# Patient Record
Sex: FEMALE | Race: WHITE | NOT HISPANIC OR LATINO | Employment: FULL TIME | URBAN - METROPOLITAN AREA
[De-identification: names, ages, dates, MRNs, and addresses within clinical notes are randomized per-mention and may not be internally consistent; named-entity substitution may affect disease eponyms.]

---

## 2021-07-21 ENCOUNTER — OFFICE VISIT (OUTPATIENT)
Dept: OBGYN CLINIC | Facility: CLINIC | Age: 46
End: 2021-07-21
Payer: COMMERCIAL

## 2021-07-21 VITALS
TEMPERATURE: 97.5 F | HEART RATE: 63 BPM | DIASTOLIC BLOOD PRESSURE: 60 MMHG | WEIGHT: 124.8 LBS | SYSTOLIC BLOOD PRESSURE: 98 MMHG

## 2021-07-21 DIAGNOSIS — Z12.4 CERVICAL CANCER SCREENING: ICD-10-CM

## 2021-07-21 DIAGNOSIS — R10.2 PELVIC PAIN: ICD-10-CM

## 2021-07-21 DIAGNOSIS — D25.1 INTRAMURAL LEIOMYOMA OF UTERUS: Primary | ICD-10-CM

## 2021-07-21 PROCEDURE — G0476 HPV COMBO ASSAY CA SCREEN: HCPCS | Performed by: OBSTETRICS & GYNECOLOGY

## 2021-07-21 PROCEDURE — 99203 OFFICE O/P NEW LOW 30 MIN: CPT | Performed by: OBSTETRICS & GYNECOLOGY

## 2021-07-21 PROCEDURE — G0145 SCR C/V CYTO,THINLAYER,RESCR: HCPCS | Performed by: OBSTETRICS & GYNECOLOGY

## 2021-07-21 RX ORDER — IBUPROFEN 600 MG/1
600 TABLET ORAL EVERY 6 HOURS PRN
Qty: 30 TABLET | Refills: 0 | Status: SHIPPED | OUTPATIENT
Start: 2021-07-21 | End: 2021-09-08 | Stop reason: HOSPADM

## 2021-07-21 RX ORDER — NORETHINDRONE ACETATE AND ETHINYL ESTRADIOL 1MG-20(21)
1 KIT ORAL DAILY
COMMUNITY
End: 2021-11-18 | Stop reason: HOSPADM

## 2021-07-21 RX ORDER — MISOPROSTOL 200 UG/1
200 TABLET ORAL DAILY
Qty: 2 TABLET | Refills: 0 | Status: SHIPPED | OUTPATIENT
Start: 2021-07-21 | End: 2021-11-17

## 2021-07-21 NOTE — PATIENT INSTRUCTIONS
Endometrial Ablation   WHAT YOU SHOULD KNOW:   Endometrial ablation (EA) is a procedure to destroy the endometrium (lining of your uterus)  You may need EA if you have heavy or abnormal vaginal bleeding  CARE AGREEMENT:   You have the right to help plan your care  Learn about your health condition and how it may be treated  Discuss treatment options with your caregivers to decide what care you want to receive  You always have the right to refuse treatment  RISKS:   · You may have nausea, vomiting, or abdominal cramps  You may have vaginal discharge and bleeding after the procedure  Your cervix, uterus, or nearby organs may be burned or damaged  A blockage may form that causes blood to pool inside your uterus  · You may develop an infection in your vagina, urinary tract, or uterus  The infection may spread to other parts of your body  You may get a blood clot in your leg or arm  Your body may absorb too much fluid that was used to widen your uterus  This may cause brain swelling and damage  These may become life-threatening  GETTING READY:   The week before your procedure:   · Write down the correct date, time, and location of your procedure  · Arrange a ride home  Ask a family member or friend to drive you home after your surgery or procedure  Do not drive yourself home  · Ask your caregiver if you need to stop using aspirin or any other prescribed or over-the-counter medicine before your procedure or surgery  · Bring your medicine bottles or a list of your medicines when you see your caregiver  Tell your caregiver if you are allergic to any medicine  Tell your caregiver if you use any herbs, food supplements, or over-the-counter medicine  · You may need to have a transvaginal ultrasound to check the thickness of your uterine lining  You may need a hysteroscopy or other imaging tests to check the size and shape of your uterus and cervix   Talk to your healthcare provider about these or other tests you may need  Write down the date, time, and location for each test   The night before your procedure:  Ask caregivers about directions for eating and drinking  The day of your procedure:   · Ask your caregiver before taking any medicine on the day of your procedure  These medicines include insulin, diabetic pills, high blood pressure pills, or heart pills  Bring a list of all the medicines you take, or your pill bottles, with you to the hospital     · You or a close family member will be asked to sign a legal document called a consent form  It gives caregivers permission to do the procedure or surgery  It also explains the problems that may happen, and your choices  Make sure all your questions are answered before you sign this form  · Caregivers may insert an intravenous tube (IV) into your vein  A vein in the arm is usually chosen  Through the IV tube, you may be given liquids and medicine  · An anesthesiologist will talk to you before your surgery  You may need medicine to keep you asleep or numb an area of your body during surgery  Tell caregivers if you or anyone in your family has had a problem with anesthesia in the past   TREATMENT:   What will happen: Your surgeon will widen the opening of your cervix with medicine or medical tools  He will use one of the following to destroy the lining of your uterus:  · Extremely hot fluid  flushed into your uterus or delivered through a balloon at the end of a catheter    · Ice  on the end of a probe to freeze the lining of your uterus    · Electric, microwave, or radiofrequency energy  given off through a heated roller ball, wire loop, probe, or mesh device  After your procedure: You will be taken to a room to rest until you are fully awake  You will be monitored closely for any problems  Do not get out of bed until your healthcare provider says it is okay  You will then be able to go home or be taken to your hospital room     CONTACT A CAREGIVER IF: · You cannot make it to your procedure  · You have a fever  · You think you might be pregnant  · You get a cold or the flu  · You have questions or concerns about your procedure  SEEK CARE IMMEDIATELY IF:   · You have increased blood loss during your monthly period  · You have new weakness or dizziness  © 2014 [address] [employer] is for End User's use only and may not be sold, redistributed or otherwise used for commercial purposes  All illustrations and images included in CareNotes® are the copyrighted property of [employer]  or Mario Ramirez  The above information is an  only  It is not intended as medical advice for individual conditions or treatments  Talk to your doctor, nurse or pharmacist before following any medical regimen to see if it is safe and effective for you

## 2021-07-21 NOTE — PROGRESS NOTES
Assessment/Plan:     Diagnoses and all orders for this visit:    Intramural leiomyoma of uterus  -     misoprostol (CYTOTEC) 200 mcg tablet; Take 1 tablet (200 mcg total) by mouth daily for 2 days Prior to procedure  -     Ambulatory referral to Interventional Radiology  -     ibuprofen (MOTRIN) 600 mg tablet; Take 1 tablet (600 mg total) by mouth every 6 (six) hours as needed for moderate pain    Pelvic pain  -     ibuprofen (MOTRIN) 600 mg tablet; Take 1 tablet (600 mg total) by mouth every 6 (six) hours as needed for moderate pain    Cervical cancer screening  -     Liquid-based pap, screening    Other orders  -     norethindrone-ethinyl estradiol (JUNEL FE 1/20) 1-20 MG-MCG per tablet; Take 1 tablet by mouth daily            Discussed with patient that uterine fibroids are common benign tumor  The could either grow or regress in size  Discussed with patient that asymptomatic uterine fibroids can usually be followed without intervention       Discussed with patient that prophylactic therapy to avoid future complications from fibroids is not recommended  The goal for treatment of fibroids is to relieve symptoms including abnormal uterine bleeding, pain or pressure  Plans for treatment should be individualized based on type and severity of symptoms, size of fibroids, location of fibroids as well as patient age and plans for future fertility  For expectant management, we can follow annual pelvic exams or follow-up imaging if uterine size is increasing or if symptoms are worsening  With regards to medical therapy, a trial of medical therapy in premenopausal women with mild symptoms or mildly enlarged fibroid can be useful for helping treat symptoms of heavy menstrual bleeding  Options to treat heavy bleeding associated with fibroids may include combined oral contraceptive pills versus progestin therapy versus levonorgestrel releasing Intrauterine device versus progestin injection    She can also use tranexamic acid PRN heavy menses  Another option for medical therapy is GnRH agonist such as depo Lupron  This have been known to decrease the size of uterine fibroids within 3 months of initiating therapy  There is rapid resumption of menses after discontinuing of this medication  There is up articular and goal in mind which should be reduction of uterine size prior to surgery and or resolution of anemia or both  There are side effects associated with GnRH agonist therapy including bone loss  She declines medical treatment but wishes to continue oral contraceptive pills    Surgery is the mainstay of therapy for fibroids  Hysterectomy is recommended for women who have  completed their childbearing that have abnormal uterine bleeding or bulk related symptoms who have not responded to medical therapy, and women who desired definitive treatment  Myomectomy is an option for women who have not completed their child bearing  And women will have completed childbearing, endometrial ablation may be an option to treat bleeding abnormalities  However these may only be used for women with a normal size cavity  Patient is not interested in hysterectomy  She is potentially exploring uterine artery embolization  Discussed with patient risks associated with uterine embolization including increased pain after the procedure  Discussed focused ultrasound surgery and referral to a physician that performs these procedures  Advised endometrial biopsy for now to rule out endometrial cancer  Referral to Interventional Radiology was placed for possible uterine artery embolization  Subjective   Patient ID: Karley Jara is a 48 y o  female  Patient is here for a problem visit  Chief Complaint   Patient presents with   [facility] Patient Visit     Patient presents to office to disucss fibroids and cysts  Patient states she has had pain for the past 3 months and feels bloated constantly  Patient is here due to pain related to fibroids  She has been seen by PCP at [facility] and has had a pelvic ultrasound as well as an MRI  H/o fibroids and cysts  On Junel 1/20 for OCPs    Moved from [facility] 1 year ago     x 2   1 SAB  Sexually active      She is having pain x 3 months  She states pain is ranges from 3-4/10 to 10/10, constant pain  She has no pain with intercourse  She has bloating  She has had no changes in bowel habits  No pain/trouble with urination  Pelvic US      She has not seen a GI  Menstrual History:  OB History        3    Para   2    Term   2       0    AB   1    Living   2       SAB   0    TAB   1    Ectopic   0    Multiple   0    Live Births   2           Obstetric Comments   Menarche     x 2  ON Junel 1/20              Menarche age: 13  No LMP recorded (lmp unknown)  She has very light periods on OCPs  Periods last 2-3 days  She has no pain with periods  History reviewed  No pertinent past medical history  Past Surgical History:   Procedure Laterality Date    TONSILLECTOMY      WISDOM TOOTH EXTRACTION         Social History     Tobacco Use    Smoking status: Former Smoker     Types: Cigarettes    Smokeless tobacco: Never Used    Tobacco comment: Smoked from ages [age]   Vaping Use    Vaping Use: Never used   Substance Use Topics    Alcohol use: Yes     Comment: Wine    Drug use: Never        No Known Allergies      Current Outpatient Medications:     norethindrone-ethinyl estradiol (JUNEL FE 1/20) 1-20 MG-MCG per tablet, Take 1 tablet by mouth daily, Disp: , Rfl:     ibuprofen (MOTRIN) 600 mg tablet, Take 1 tablet (600 mg total) by mouth every 6 (six) hours as needed for moderate pain, Disp: 30 tablet, Rfl: 0    misoprostol (CYTOTEC) 200 mcg tablet, Take 1 tablet (200 mcg total) by mouth daily for 2 days Prior to procedure, Disp: 2 tablet, Rfl: 0      Review of Systems   Constitutional: Negative  HENT: Negative      Eyes: Negative  Respiratory: Negative  Cardiovascular: Negative  Gastrointestinal: Negative  Endocrine: Negative  Genitourinary:        As noted in HPI   Musculoskeletal: Negative  Skin: Negative  Allergic/Immunologic: Negative  Neurological: Negative  Hematological: Negative  Psychiatric/Behavioral: Negative  BP 98/60 (BP Location: Right arm, Patient Position: Sitting, Cuff Size: Adult)   Pulse 63   Temp 97 5 °F (36 4 °C) (Temporal)   Wt 56 6 kg (124 lb 12 8 oz)   LMP  (LMP Unknown)       Physical Exam  Constitutional:       General: She is not in acute distress  Appearance: She is well-developed  Genitourinary:      Pelvic exam was performed with patient in the lithotomy position  Inguinal canal, urethra, bladder, cervix, right adnexa and left adnexa normal       No vulval lesion, tenderness, ulcerations, Bartholin's cyst or rash noted  No signs of labial injury  No posterior fourchette tenderness, injury, rash or lesion present  No signs of injury or lesions in the vagina  No vaginal discharge, erythema, tenderness, bleeding, atrophy or prolapse  No cervical polyp  Uterus is enlarged and tender  No uterine mass detected  Uterus is regular  No right or left adnexal mass present  Right adnexa not tender or full  Left adnexa not tender or full  Genitourinary Comments: Uterus is tender and large mainly on patient's right   Abdominal:      General: There is no distension  Palpations: Abdomen is soft  Tenderness: There is no abdominal tenderness  Neurological:      Mental Status: She is alert and oriented to person, place, and time  Skin:     General: Skin is warm and dry  Psychiatric:         Behavior: Behavior normal          CBC AND DIFFERENTIAL  Order: [id number]  (suggestion)  Result Information displayed in this report will not trend and may not trigger automated decision support      Ref Range & Units 5/28/21  7:46 AM   Hemoglobin 11 5 - 14 5 g/dL 14 0        Hematocrit 35 0 - 43 0 % 41 3        WBC 4 0 - 10 0 thou/cmm 6 1        RBC 3 70 - 4 70 mill/cmm 4 54        Platelet Count 046 - 350 thou/cmm 214        MPV 7 5 - 11 3 fL 8 3        MCV 80 - 100 fL 91        MCH 26 0 - 34 0 pg 30 8        MCHC 32 0 - 37 0 g/dL 33 9        RDW 12 0 - 16 0 % 13 0        Differential Type   AUTO        Absolute Neutrophils 1 8 - 7 8 thou/cmm 4 0        Absolute Lymphocytes 1 0 - 3 0 thou/cmm 1 4        Absolute Monocytes 0 3 - 1 0 thou/cmm 0 4        Absolute Eosinophils 0 0 - 0 5 thou/cmm 0 2        Absolute Basophils 0 0 - 0 1 thou/cmm 0 0        Neutrophils  % 64        Lymphocytes  % 24        Monocytes  % 7        Eosinophils  % 4        Basophils  % 1        Specimen Collected: 05/28/21  7:46 AM Last Resulted: 05/28/21  1:34 PM   Received From: [facility]  Result Received: 07/20/21  9:50 PM             US TRANSVAGINAL    Impression    IMPRESSION:   1  A large mass at the right, posterior uterine body measures up to 5 1 cm and   contains a large central cystic component measuring up to 3 6 cm  This mass is   of uncertain etiology, though could represent a uterine fibroid with central   cystic necrosis  A neoplastic etiology cannot be excluded  A normal right ovary   is not visualized, though a right ovarian mass would be less likely, as there is   myometrial tissue splayed around the anterior and medial aspects of the mass  Contrast-enhanced pelvic MRI is recommended for more detailed evaluation of the   mass and to evaluate for the presence of a normal right ovary  2  A posterior uterine fundal fibroid measures up to 2 7 cm    3  Normal endometrium measuring up to 3 6 mm    4  Normal left ovary containing a dominant follicle measuring up to 1 9 cm  Workstation:[id number]  Narrative    History: Right lower quadrant pain  Transvaginal ultrasound of the pelvis was performed  No transabdominal   examination was performed, as specifically requested by the referring clinician  There are no prior studies at this institution for comparison  The uterus measures 9 1 cm long, 5 3 cm thick and 5 6 cm wide  There is   heterogeneous myometrial echotexture  At the posterior uterine fundus, there is   a subserosal fibroid measuring 2 4 x 2 7 x 2 5 cm  The endometrium measures up   to 3 6 mm thick, within normal limits  There is a large mass at the right posterior uterine body, with myometrial   tissue splayed around the anterior and medial aspects of the mass  The mass   measures approximately 5 1 x 4 6 x 5 1 cm and demonstrates a peripheral,   heterogeneous soft tissue component and a large central cystic component  The   central cystic component measures 3 5 x 3 6 x 3 3 cm  There is bloodflow within   the peripheral soft tissue component  This mass exerts some mass effect on the   right aspect of the endometrium, mildly displacing the right endometrium   anteriorly  No normal ovary is identified  The left ovary measures 2 7 x 2 3 x 2 5 cm and   contains a dominant follicle measuring 1 9 x 1 8 x 1 5 cm  There is arterial and   venous blood flow within the left ovary on Doppler examination  There is no   extraovarian left adnexal mass or fluid collection  There is no free pelvic   fluid  Other Result Text    Nuvia Ramires MD - 06/25/2021   Formatting of this note might be different from the original    History: Right lower quadrant pain  Transvaginal ultrasound of the pelvis was performed  No transabdominal   examination was performed, as specifically requested by the referring clinician  There are no prior studies at this institution for comparison  The uterus measures 9 1 cm long, 5 3 cm thick and 5 6 cm wide  There is   heterogeneous myometrial echotexture  At the posterior uterine fundus, there is   a subserosal fibroid measuring 2 4 x 2 7 x 2 5 cm   The endometrium measures up   to 3 6 mm thick, within normal limits  There is a large mass at the right posterior uterine body, with myometrial   tissue splayed around the anterior and medial aspects of the mass  The mass   measures approximately 5 1 x 4 6 x 5 1 cm and demonstrates a peripheral,   heterogeneous soft tissue component and a large central cystic component  The   central cystic component measures 3 5 x 3 6 x 3 3 cm  There is bloodflow within   the peripheral soft tissue component  This mass exerts some mass effect on the   right aspect of the endometrium, mildly displacing the right endometrium   anteriorly  No normal ovary is identified  The left ovary measures 2 7 x 2 3 x 2 5 cm and   contains a dominant follicle measuring 1 9 x 1 8 x 1 5 cm  There is arterial and   venous blood flow within the left ovary on Doppler examination  There is no   extraovarian left adnexal mass or fluid collection  There is no free pelvic   fluid  IMPRESSION:   IMPRESSION:   1  A large mass at the right, posterior uterine body measures up to 5 1 cm and   contains a large central cystic component measuring up to 3 6 cm  This mass is   of uncertain etiology, though could represent a uterine fibroid with central   cystic necrosis  A neoplastic etiology cannot be excluded  A normal right ovary   is not visualized, though a right ovarian mass would be less likely, as there is   myometrial tissue splayed around the anterior and medial aspects of the mass  Contrast-enhanced pelvic MRI is recommended for more detailed evaluation of the   mass and to evaluate for the presence of a normal right ovary  2  A posterior uterine fundal fibroid measures up to 2 7 cm    3  Normal endometrium measuring up to 3 6 mm    4  Normal left ovary containing a dominant follicle measuring up to 1 9 cm                     Workstation:[id number]     Date: 06/25/21   Received From: [facility]         MRI [facility] COGNITIVE DISORDERS CONTRAST    Impression    IMPRESSION:   1  Uterine leiomyomas, one of which in the right uterine body appears to show   cystic degeneration   2  Normal ovaries           Workstation:[id number]  Narrative    History: Abnormal ultrasound demonstrating uterine mass     MRI of the pelvis was obtained on a 1 5 [facility] MRI unit with and without   intravenous contrast  Postcontrast sequences include axial, coronal and sagittal   T1-weighted fat saturated sequences obtained after administration of 5 5 cc of   Gadavist intravenously  Comparison made to ultrasound 6/25/2021  FINDINGS: Uterus is slightly bulky and lobulated, secondary to a T2 hypointense   intramural leiomyoma located at the right uterine fundus measuring about 2 5 x   2 2 x 1 6 cm  Just posterior to this region in the right uterine body, there is in intramural   cystic lesion with fluid fluid level measuring about 4 7 x 3 8 x 3 9 cm  There   is a T2 hypointense rim around this mass suggesting that this represents a   leiomyoma with cystic degeneration  This distorts and displacing the endometrium   to the left  Endometrium itself is unremarkable  Multiple small nabothian cysts are present   in the cervix  Bilateral ovaries are normal in appearance with small,   physiologic follicles within  There is no free fluid in the pelvis  There is no adenopathy in the pelvis  Visualized bones are unremarkable  Other Result Text    Zachary Alva MD - 07/12/2021   Formatting of this note might be different from the original    History: Abnormal ultrasound demonstrating uterine mass     MRI of the pelvis was obtained on a 1 5 [facility] MRI unit with and without   intravenous contrast  Postcontrast sequences include axial, coronal and sagittal   T1-weighted fat saturated sequences obtained after administration of 5 5 cc of   Gadavist intravenously  Comparison made to ultrasound 6/25/2021       FINDINGS: Uterus is slightly bulky and lobulated, secondary to a T2 hypointense   intramural leiomyoma located at the right uterine fundus measuring about 2 5 x   2 2 x 1 6 cm  Just posterior to this region in the right uterine body, there is in intramural   cystic lesion with fluid fluid level measuring about 4 7 x 3 8 x 3 9 cm  There   is a T2 hypointense rim around this mass suggesting that this represents a   leiomyoma with cystic degeneration  This distorts and displacing the endometrium   to the left  Endometrium itself is unremarkable  Multiple small nabothian cysts are present   in the cervix  Bilateral ovaries are normal in appearance with small,   physiologic follicles within  There is no free fluid in the pelvis  There is no adenopathy in the pelvis  Visualized bones are unremarkable  IMPRESSION:   IMPRESSION:   1  Uterine leiomyomas, one of which in the right uterine body appears to show   cystic degeneration   2   Normal ovaries           Workstation:[id number]     Date: 07/12/21   Received From: [facility]

## 2021-07-23 LAB
HPV HR 12 DNA CVX QL NAA+PROBE: NEGATIVE
HPV16 DNA CVX QL NAA+PROBE: NEGATIVE
HPV18 DNA CVX QL NAA+PROBE: NEGATIVE

## 2021-07-27 ENCOUNTER — HOSPITAL ENCOUNTER (OUTPATIENT)
Dept: RADIOLOGY | Facility: HOSPITAL | Age: 46
Discharge: HOME | End: 2021-07-27
Attending: RADIOLOGY

## 2021-07-27 DIAGNOSIS — Z76.89 REFERRAL OF PATIENT WITHOUT EXAMINATION OR TREATMENT: ICD-10-CM

## 2021-07-28 LAB
LAB AP GYN PRIMARY INTERPRETATION: NORMAL
Lab: NORMAL

## 2021-07-30 ENCOUNTER — TELEPHONE (OUTPATIENT)
Dept: OTHER | Facility: OTHER | Age: 46
End: 2021-07-30

## 2021-07-30 ENCOUNTER — TELEMEDICINE (OUTPATIENT)
Dept: INTERVENTIONAL RADIOLOGY/VASCULAR | Facility: CLINIC | Age: 46
End: 2021-07-30
Payer: COMMERCIAL

## 2021-07-30 DIAGNOSIS — D25.9 UTERINE LEIOMYOMA, UNSPECIFIED LOCATION: Primary | ICD-10-CM

## 2021-07-30 PROCEDURE — 99213 OFFICE O/P EST LOW 20 MIN: CPT | Performed by: RADIOLOGY

## 2021-07-30 NOTE — PROGRESS NOTES
Virtual Brief Visit    Verification of patient location:    Patient is located in the following state in which I hold an active license [place]      Assessment/Plan:    Problem List Items Addressed This Visit        Genitourinary    Uterine leiomyoma - Primary     48 y F with medical history of pelvic pain and fibroid uterus  Patient is seen in IR clinic for consideration of uterine artery embolization  I discussed the different options to treat symptomatic uterine fibroids  including watchful waiting, medical therapy, surgery, and uterine artery embolization  I discussed with the patient that hysterectomy is a curative therapy because the uterus is completely removed  I discussed the benefits, risks and procedure details of uterine artery embolization  Specifically, I discussed that the procedure is often done using moderate sedation and that favorable outcomes are generally seen in roughly 90% of women with an approximate 10% reintervention rate either with repeat uterine artery embolization or hysterectomy  However, I discussed that symptomatic improvement is often not immediate and more commonly seen in patients with heavy bleeding and bulk related pain  I discussed with the patient that a significant amount of her pain could be related to a large fibroid that is infarcting due to the observation of cystic degeneration  I explained that there is still roughly 10 to 20% peripheral enhancing component and that uterine artery embolization would potentially infarct the remaining amount  This may or may not eliminate the pain she has over time  I also stated that it is unclear whether or not she is having bulk related pain or if the pain is just related to the degenerating fibroid  I stated that there be no guarantee that she would have resolution of her pain following uterine artery embolization    Given her reported history of urinary frequency and intermittent feelings of incomplete voiding, there could be some reduction in her uterine volume which could potentially produce mass effect upon the bladder if indeed this is the culprit  I discussed that we often admit overnight for observation and management of pain  I discussed risks such as bleeding, infection, nontarget embolization, myometrial injury, and transient or permanent amenorrhea (1 to 5%)  Rarely, pulmonary embolism  Additionally, I discussed that patients need to be willing to undergo hysterectomy in the event of [place] complication such a possible sloughed fibroid with non passage resulting in infection  Following our discussion, the patient will think about  I will follow-up with her on Monday 8/9/2021  Reason for visit is   Chief Complaint   Patient presents with    Virtual Brief Visit        Encounter provider Irving Marroquin DO    Provider located at [address] [place] [address],Third Floor  [facility] [address]  [phone]    Recent Visits  Date Type Provider Dept   07/30/21 [address], DO Pg Ir Galina Harman recent visits within past 7 days and meeting all other requirements  Future Appointments  No visits were found meeting these conditions  Showing future appointments within next 150 days and meeting all other requirements       After connecting through telephone and patient was informed that this is not a secure, HIPAA-compliant platform  She agrees to proceed  , the patient was identified by name and date of birth  My office door was closed  No one else was in the room  She acknowledged consent and understanding of privacy and security of the platform  The patient has agreed to participate and understands she can discontinue the visit at any time  Patient is aware this is a billable service  CC: Pelvic pain      HPI:  42-year-old female with history of uterine fibroids with pelvic pain referred to interventional radiology to discuss possible uterine artery embolization  The visit was conducted by telephone  The patient reports history of pain that started approximately 1 year ago and then went away  She is originally from [place]  Three months ago, the pain started again and has been fairly constant with intermittent degrees of severity  She describes cramping and some times stabbing pelvic pain  She is currently on misoprostol and ibuprofen  She denies any issues with heavy bleeding  She complains of intermittent urinary frequency and occasional incomplete voiding  She has no pain with intercourse  She endorses bloating and abdominal distension  She is currently on oral birth control pills  She denies history of genitourinary infection or malignancy   x 2, SAB x 1  PAP smear negative (2021)  She had US (21) and MRI (2021) at [facility] showing fibroids  Intramural right body 4 5 cm fibroid with cystic degeneration  Right intramural 2 5 cm fundal fibroid  No past medical history on file  Past Surgical History:   Procedure Laterality Date    TONSILLECTOMY      WISDOM TOOTH EXTRACTION         Current Outpatient Medications   Medication Sig Dispense Refill    ibuprofen (MOTRIN) 600 mg tablet Take 1 tablet (600 mg total) by mouth every 6 (six) hours as needed for moderate pain 30 tablet 0    misoprostol (CYTOTEC) 200 mcg tablet Take 1 tablet (200 mcg total) by mouth daily for 2 days Prior to procedure 2 tablet 0    norethindrone-ethinyl estradiol (JUNEL FE 1/20) 1-20 MG-MCG per tablet Take 1 tablet by mouth daily       No current facility-administered medications for this visit  No Known Allergies    Review of Systems   Constitutional: Negative  HENT: Negative  Eyes: Negative  Respiratory: Negative  Cardiovascular: Negative  Gastrointestinal: Positive for abdominal distention  Endocrine: Negative  Genitourinary: Positive for frequency  Musculoskeletal: Negative  Allergic/Immunologic: Negative  Neurological: Negative  Hematological: Negative  Psychiatric/Behavioral: Negative  There were no vitals filed for this visit  Imaging: I personally reviewed her imaging  MRI 7/12/2021:   FINDINGS: Uterus is slightly bulky and lobulated, secondary to a T2 hypointense   intramural leiomyoma located at the right uterine fundus measuring about 2 5 x   2 2 x 1 6 cm  Just posterior to this region in the right uterine body, there is in intramural   cystic lesion with fluid fluid level measuring about 4 7 x 3 8 x 3 9 cm  There   is a T2 hypointense rim around this mass suggesting that this represents a   leiomyoma with cystic degeneration  This distorts and displacing the endometrium   to the left  Endometrium itself is unremarkable  Multiple small nabothian cysts are present   in the cervix  Bilateral ovaries are normal in appearance with small,   physiologic follicles within  There is no free fluid in the pelvis  There is no adenopathy in the pelvis  Visualized bones are unremarkable  I spent 30 minutes minutes with patient today in which greater than 50% of the time was spent in counseling/coordination of care regarding management of her fibroids  VIRTUAL VISIT DISCLAIMER      Muriel Narayan verbally agrees to participate in [employer]  Pt is aware that Virtual Care Services could be limited without vital signs or the ability to perform a full hands-on physical Marcell Matson understands she or the provider may request at any time to terminate the video visit and request the patient to seek care or treatment in person

## 2021-08-04 ENCOUNTER — TELEPHONE (OUTPATIENT)
Dept: INTERVENTIONAL RADIOLOGY/VASCULAR | Facility: CLINIC | Age: 46
End: 2021-08-04

## 2021-08-04 ENCOUNTER — PATIENT MESSAGE (OUTPATIENT)
Dept: INTERVENTIONAL RADIOLOGY/VASCULAR | Facility: CLINIC | Age: 46
End: 2021-08-04

## 2021-08-04 NOTE — TELEPHONE ENCOUNTER
I called pt to let her know that Dr Jazzy Ludwig had reviewed her images Monday afternoon  Once he places the order, that someone from IR Scheduling will call her to schedule the procedure  Pt was fine with this

## 2021-08-04 NOTE — TELEPHONE ENCOUNTER
I called the pt to let her know that Dr Denisa Roberts had viewed her images Monday afternoon  Once he places the order that someone form IR Central Scheduling will call to schedule procedure

## 2021-08-04 NOTE — TELEPHONE ENCOUNTER
----- Message from Meagan Samaniego RN sent at 8/4/2021  8:17 AM EDT -----  Regarding: FW: Visit Follow-Up Question  Contact: [phone]    ----- Message -----  From: Belinda Vasquez  Sent: 8/4/2021   8:09 AM EDT  To: [facility]  Subject: FW: Visit Follow-Up Question                       ----- Message -----  From: Jael Tavares  Sent: 8/4/2021   8:03 AM EDT  To: [facility]  Subject: Visit Follow-Up Question                         Raffaele Postal all is well with you  Nobody has yet called me to schedule the procedure  I was wondering if there was a problem with retrieving the MRI's images?     Thank you  Lawanda Ugarte

## 2021-08-06 PROBLEM — D25.9 UTERINE LEIOMYOMA: Status: ACTIVE | Noted: 2021-08-06

## 2021-08-06 NOTE — ASSESSMENT & PLAN NOTE
48 y F with medical history of pelvic pain and fibroid uterus  Patient is seen in IR clinic for consideration of uterine artery embolization  I discussed the different options to treat symptomatic uterine fibroids  including watchful waiting, medical therapy, surgery, and uterine artery embolization  I discussed with the patient that hysterectomy is a curative therapy because the uterus is completely removed  I discussed the benefits, risks and procedure details of uterine artery embolization  Specifically, I discussed that the procedure is often done using moderate sedation and that favorable outcomes are generally seen in roughly 90% of women with an approximate 10% reintervention rate either with repeat uterine artery embolization or hysterectomy  However, I discussed that symptomatic improvement is often not immediate and more commonly seen in patients with heavy bleeding and bulk related pain  I discussed with the patient that a significant amount of her pain could be related to a large fibroid that is infarcting due to the observation of cystic degeneration  I explained that there is still roughly 10 to 20% peripheral enhancing component and that uterine artery embolization would potentially infarct the remaining amount  This may or may not eliminate the pain she has over time  I also stated that it is unclear whether or not she is having bulk related pain or if the pain is just related to the degenerating fibroid  I stated that there be no guarantee that she would have resolution of her pain following uterine artery embolization  Given her reported history of urinary frequency and intermittent feelings of incomplete voiding, there could be some reduction in her uterine volume which could potentially produce mass effect upon the bladder if indeed this is the culprit  I discussed that we often admit overnight for observation and management of pain    I discussed risks such as bleeding, infection, nontarget embolization, myometrial injury, and transient or permanent amenorrhea (1 to 5%)  Rarely, pulmonary embolism  Additionally, I discussed that patients need to be willing to undergo hysterectomy in the event of [place] complication such a possible sloughed fibroid with non passage resulting in infection  Following our discussion, the patient will think about  I will follow-up with her on Monday 8/9/2021

## 2021-08-23 ENCOUNTER — TELEPHONE (OUTPATIENT)
Dept: INTERVENTIONAL RADIOLOGY/VASCULAR | Facility: CLINIC | Age: 46
End: 2021-08-23

## 2021-08-23 NOTE — QUICK NOTE
Left VM, just called to follow up to see if patient had any questions re: [place] and whether or not she would like to proceed  car

## 2021-09-02 ENCOUNTER — TELEPHONE (OUTPATIENT)
Dept: GASTROENTEROLOGY | Facility: AMBULARY SURGERY CENTER | Age: 46
End: 2021-09-02

## 2021-09-02 ENCOUNTER — PREP FOR PROCEDURE (OUTPATIENT)
Dept: GASTROENTEROLOGY | Facility: CLINIC | Age: 46
End: 2021-09-02

## 2021-09-02 DIAGNOSIS — Z12.11 SPECIAL SCREENING FOR MALIGNANT NEOPLASMS, COLON: Primary | ICD-10-CM

## 2021-09-02 NOTE — TELEPHONE ENCOUNTER
Tc to pt to schedule OA colon  Colon scheduled for Wednesday, 9/8/21, at [address] with Dr Zakia Dorantes    Dulcolax/Miralax prep instructions emailed to pt at [email or web address]

## 2021-09-02 NOTE — TELEPHONE ENCOUNTER
Date:    Screened by Iris Nicholson     Referring Provider: Dr Lisa Alas     Pre- Screening:  BMI: [ x ] Has patient been referred for a routine screening Colonoscopy? [ Yes ]    Is the patient between the age of 39-70 years old? Yes   Past Colonoscopy? If yes - Date: [     ]   Physician/Facility: [     ]  Reason:  [                                [facility] [place]: If the patient is between 39yrs-47yrs, please advise patient to confirm benefits/coverage with their insurance company for a routine screening colonoscopy, some insurance carriers will only cover at [facility] or [facility]  If the patient is over 66years old, please schedule an office visit   Does the patient want to see a Gastroenterologist prior to their procedure OR are they having any GI symptoms? NO   Has the patient been hospitalized or had abdominal surgery in the past 6 months? NO   Does the patient use supplemental oxygen? NO   Do you take [ Coumadin ], [ Lovenox ], [ Plavix ], [ Marnette Harvey ], [ [place] ], or other blood thinning medication? [ No ]      SCHEDULING STAFF: If patient answers NO to above questions, then schedule procedure  If patient answers YES to above questions, then schedule office appointment  Patient passed OA and can be reached at [phone]     If patient is between 45yrs - 49yrs, please advise patient that we will have to confirm benefits & coverage with their insurance company for a routine screening colonoscopy

## 2021-09-07 ENCOUNTER — TELEPHONE (OUTPATIENT)
Dept: GASTROENTEROLOGY | Facility: MEDICAL CENTER | Age: 46
End: 2021-09-07

## 2021-09-08 ENCOUNTER — APPOINTMENT (OUTPATIENT)
Dept: LAB | Facility: MEDICAL CENTER | Age: 46
End: 2021-09-08
Attending: INTERNAL MEDICINE
Payer: COMMERCIAL

## 2021-09-08 ENCOUNTER — ANESTHESIA (OUTPATIENT)
Dept: GASTROENTEROLOGY | Facility: MEDICAL CENTER | Age: 46
End: 2021-09-08

## 2021-09-08 ENCOUNTER — ANESTHESIA EVENT (OUTPATIENT)
Dept: GASTROENTEROLOGY | Facility: MEDICAL CENTER | Age: 46
End: 2021-09-08

## 2021-09-08 ENCOUNTER — HOSPITAL ENCOUNTER (OUTPATIENT)
Dept: GASTROENTEROLOGY | Facility: MEDICAL CENTER | Age: 46
Setting detail: OUTPATIENT SURGERY
Discharge: HOME | End: 2021-09-08
Attending: INTERNAL MEDICINE
Payer: COMMERCIAL

## 2021-09-08 VITALS
RESPIRATION RATE: 18 BRPM | HEART RATE: 74 BPM | WEIGHT: 123 LBS | BODY MASS INDEX: 22.63 KG/M2 | SYSTOLIC BLOOD PRESSURE: 112 MMHG | DIASTOLIC BLOOD PRESSURE: 68 MMHG | TEMPERATURE: 99 F | HEIGHT: 62 IN | OXYGEN SATURATION: 100 %

## 2021-09-08 DIAGNOSIS — G89.29 CHRONIC RLQ PAIN: ICD-10-CM

## 2021-09-08 DIAGNOSIS — R10.31 CHRONIC RLQ PAIN: Primary | ICD-10-CM

## 2021-09-08 DIAGNOSIS — Z12.11 SPECIAL SCREENING FOR MALIGNANT NEOPLASMS, COLON: ICD-10-CM

## 2021-09-08 DIAGNOSIS — G89.29 CHRONIC RLQ PAIN: Primary | ICD-10-CM

## 2021-09-08 DIAGNOSIS — R10.31 CHRONIC RLQ PAIN: ICD-10-CM

## 2021-09-08 LAB
EXT PREGNANCY TEST URINE: NEGATIVE
EXT. CONTROL: NORMAL

## 2021-09-08 PROCEDURE — 88305 TISSUE EXAM BY PATHOLOGIST: CPT | Performed by: PATHOLOGY

## 2021-09-08 PROCEDURE — 81025 URINE PREGNANCY TEST: CPT | Performed by: ANESTHESIOLOGY

## 2021-09-08 PROCEDURE — 45380 COLONOSCOPY AND BIOPSY: CPT | Performed by: INTERNAL MEDICINE

## 2021-09-08 PROCEDURE — 45385 COLONOSCOPY W/LESION REMOVAL: CPT | Performed by: INTERNAL MEDICINE

## 2021-09-08 RX ORDER — PROPOFOL 10 MG/ML
INJECTION, EMULSION INTRAVENOUS AS NEEDED
Status: DISCONTINUED | OUTPATIENT
Start: 2021-09-08 | End: 2021-09-08

## 2021-09-08 RX ORDER — SODIUM CHLORIDE 9 MG/ML
125 INJECTION, SOLUTION INTRAVENOUS CONTINUOUS
Status: DISCONTINUED | OUTPATIENT
Start: 2021-09-08 | End: 2021-09-12 | Stop reason: HOSPADM

## 2021-09-08 RX ADMIN — PROPOFOL 50 MG: 10 INJECTION, EMULSION INTRAVENOUS at 11:16

## 2021-09-08 RX ADMIN — PROPOFOL 100 MG: 10 INJECTION, EMULSION INTRAVENOUS at 10:58

## 2021-09-08 RX ADMIN — Medication 40 MG: at 11:03

## 2021-09-08 RX ADMIN — PROPOFOL 50 MG: 10 INJECTION, EMULSION INTRAVENOUS at 11:00

## 2021-09-08 RX ADMIN — PROPOFOL 50 MG: 10 INJECTION, EMULSION INTRAVENOUS at 11:08

## 2021-09-08 RX ADMIN — PROPOFOL 50 MG: 10 INJECTION, EMULSION INTRAVENOUS at 11:02

## 2021-09-08 RX ADMIN — PROPOFOL 50 MG: 10 INJECTION, EMULSION INTRAVENOUS at 11:12

## 2021-09-08 RX ADMIN — PROPOFOL 50 MG: 10 INJECTION, EMULSION INTRAVENOUS at 11:05

## 2021-09-08 RX ADMIN — SODIUM CHLORIDE 125 ML/HR: 0.9 INJECTION, SOLUTION INTRAVENOUS at 10:07

## 2021-09-08 NOTE — ANESTHESIA PREPROCEDURE EVALUATION
Procedure:  COLONOSCOPY    Relevant Problems   No relevant active problems        Physical Exam    Airway    Mallampati score: II  TM Distance: >3 FB  Neck ROM: full     Dental       Cardiovascular  Rhythm: regular, Rate: normal, Cardiovascular exam normal    Pulmonary  Pulmonary exam normal Breath sounds clear to auscultation,     Other Findings        Anesthesia Plan  ASA Score- 2     Anesthesia Type- IV sedation with anesthesia with ASA Monitors  Additional Monitors:   Airway Plan:           Plan Factors-Exercise tolerance (METS): >4 METS  Chart reviewed  Existing labs reviewed  Patient is not a current smoker  Obstructive sleep apnea risk education given perioperatively  Induction- intravenous  Postoperative Plan-     Informed Consent- Anesthetic plan and risks discussed with patient

## 2021-09-08 NOTE — H&P
History and Physical - [facility]  Leeann Andre Garrett 48 y o  female MRN: [id number]                  HPI: Karely Jara is a 48y o  year old female who presents for colon cancer screening  REVIEW OF SYSTEMS: Per the HPI, and otherwise unremarkable  Historical Information   History reviewed  No pertinent past medical history    Past Surgical History:   Procedure Laterality Date    CHEST SURGERY Right     fatty tumor removed    TONSILLECTOMY      WISDOM TOOTH EXTRACTION       Social History   Social History     Substance and Sexual Activity   Alcohol Use Yes    Comment: Wine     Social History     Substance and Sexual Activity   Drug Use Never     Social History     Tobacco Use   Smoking Status Former Smoker    Types: Cigarettes   Smokeless Tobacco Never Used   Tobacco Comment    Smoked from ages [age]     Family History   Problem Relation Age of Onset    No Known Problems Mother     Heart disease Father     Hyperthyroidism Son     No Known Problems Maternal Grandmother     Diabetes Maternal Grandfather     Cancer Paternal Grandmother     Heart disease Paternal Grandfather     No Known Problems Son        Meds/Allergies       Current Outpatient Medications:     norethindrone-ethinyl estradiol (JUNEL FE 1/20) 1-20 MG-MCG per tablet    ibuprofen (MOTRIN) 600 mg tablet    misoprostol (CYTOTEC) 200 mcg tablet    Current Facility-Administered Medications:     sodium chloride 0 9 % infusion, 125 mL/hr, Intravenous, Continuous, 125 mL/hr at 09/08/21 1007    No Known Allergies    Objective     /55   Pulse 71   Temp 99 °F (37 2 °C) (Temporal)   Resp 16   Ht 5' 2" (1 575 m)   Wt 55 8 kg (123 lb)   SpO2 99%   BMI 22 50 kg/m²       PHYSICAL EXAM    Gen: NAD  Head: NCAT  CV: RRR  CHEST: Clear  ABD: soft, NT/ND  EXT: no edema      ASSESSMENT/PLAN:  This is a 48y o  year old female here for colon cancer screening, and she is stable and optimized for her procedure

## 2021-09-09 ENCOUNTER — APPOINTMENT (OUTPATIENT)
Dept: LAB | Facility: HOSPITAL | Age: 46
End: 2021-09-09
Payer: COMMERCIAL

## 2021-09-09 ENCOUNTER — APPOINTMENT (OUTPATIENT)
Dept: LAB | Facility: MEDICAL CENTER | Age: 46
End: 2021-09-09
Attending: INTERNAL MEDICINE
Payer: COMMERCIAL

## 2021-09-09 DIAGNOSIS — G89.29 CHRONIC RLQ PAIN: ICD-10-CM

## 2021-09-09 DIAGNOSIS — R10.31 CHRONIC RLQ PAIN: ICD-10-CM

## 2021-09-09 LAB
ANION GAP SERPL CALCULATED.3IONS-SCNC: 6 MMOL/L (ref 4–13)
BUN SERPL-MCNC: 16 MG/DL (ref 5–25)
CALCIUM SERPL-MCNC: 8.3 MG/DL (ref 8.3–10.1)
CHLORIDE SERPL-SCNC: 110 MMOL/L (ref 100–108)
CO2 SERPL-SCNC: 25 MMOL/L (ref 21–32)
CREAT SERPL-MCNC: 0.85 MG/DL (ref 0.6–1.3)
GFR SERPL CREATININE-BSD FRML MDRD: 81 ML/MIN/1.73SQ M
GLUCOSE P FAST SERPL-MCNC: 84 MG/DL (ref 65–99)
POTASSIUM SERPL-SCNC: 4.3 MMOL/L (ref 3.5–5.3)
SODIUM SERPL-SCNC: 141 MMOL/L (ref 136–145)

## 2021-09-09 PROCEDURE — 80048 BASIC METABOLIC PNL TOTAL CA: CPT

## 2021-09-09 PROCEDURE — 36415 COLL VENOUS BLD VENIPUNCTURE: CPT

## 2021-11-17 ENCOUNTER — APPOINTMENT (OUTPATIENT)
Dept: PREADMISSION TESTING | Facility: HOSPITAL | Age: 46
End: 2021-11-17
Payer: COMMERCIAL

## 2021-11-17 ENCOUNTER — ANESTHESIA EVENT (OUTPATIENT)
Dept: PERIOP | Facility: HOSPITAL | Age: 46
End: 2021-11-17
Payer: COMMERCIAL

## 2021-11-17 PROCEDURE — NC001 PR NO CHARGE: Performed by: OBSTETRICS & GYNECOLOGY

## 2021-11-18 ENCOUNTER — ANESTHESIA (OUTPATIENT)
Dept: PERIOP | Facility: HOSPITAL | Age: 46
End: 2021-11-18
Payer: COMMERCIAL

## 2021-11-18 ENCOUNTER — HOSPITAL ENCOUNTER (OUTPATIENT)
Facility: HOSPITAL | Age: 46
Setting detail: OUTPATIENT SURGERY
Discharge: HOME | End: 2021-11-18
Attending: OBSTETRICS & GYNECOLOGY | Admitting: OBSTETRICS & GYNECOLOGY
Payer: COMMERCIAL

## 2021-11-18 VITALS
WEIGHT: 123 LBS | OXYGEN SATURATION: 97 % | DIASTOLIC BLOOD PRESSURE: 62 MMHG | HEART RATE: 64 BPM | RESPIRATION RATE: 12 BRPM | SYSTOLIC BLOOD PRESSURE: 108 MMHG | HEIGHT: 62 IN | BODY MASS INDEX: 22.63 KG/M2 | TEMPERATURE: 98 F

## 2021-11-18 DIAGNOSIS — Z90.711 S/P LAPAROSCOPIC SUPRACERVICAL HYSTERECTOMY: Primary | ICD-10-CM

## 2021-11-18 DIAGNOSIS — R10.2 PELVIC AND PERINEAL PAIN: ICD-10-CM

## 2021-11-18 DIAGNOSIS — D25.9 LEIOMYOMA OF UTERUS, UNSPECIFIED: ICD-10-CM

## 2021-11-18 LAB
ABO GROUP BLD: NORMAL
BLD GP AB SCN SERPL QL: NEGATIVE
EST. AVERAGE GLUCOSE BLD GHB EST-MCNC: 103 MG/DL
EXT PREGNANCY TEST URINE: NEGATIVE
EXT. CONTROL: NORMAL
HBA1C MFR BLD: 5.2 %
RH BLD: POSITIVE
SPECIMEN EXPIRATION DATE: NORMAL

## 2021-11-18 PROCEDURE — 88307 TISSUE EXAM BY PATHOLOGIST: CPT | Performed by: PATHOLOGY

## 2021-11-18 PROCEDURE — 86900 BLOOD TYPING SEROLOGIC ABO: CPT | Performed by: OBSTETRICS & GYNECOLOGY

## 2021-11-18 PROCEDURE — 86850 RBC ANTIBODY SCREEN: CPT | Performed by: OBSTETRICS & GYNECOLOGY

## 2021-11-18 PROCEDURE — C1782 MORCELLATOR: HCPCS | Performed by: OBSTETRICS & GYNECOLOGY

## 2021-11-18 PROCEDURE — 81025 URINE PREGNANCY TEST: CPT | Performed by: OBSTETRICS & GYNECOLOGY

## 2021-11-18 PROCEDURE — NC001 PR NO CHARGE: Performed by: OBSTETRICS & GYNECOLOGY

## 2021-11-18 PROCEDURE — 86901 BLOOD TYPING SEROLOGIC RH(D): CPT | Performed by: OBSTETRICS & GYNECOLOGY

## 2021-11-18 PROCEDURE — 83036 HEMOGLOBIN GLYCOSYLATED A1C: CPT | Performed by: OBSTETRICS & GYNECOLOGY

## 2021-11-18 RX ORDER — CEFAZOLIN SODIUM 2 G/50ML
2000 SOLUTION INTRAVENOUS ONCE
Status: COMPLETED | OUTPATIENT
Start: 2021-11-18 | End: 2021-11-18

## 2021-11-18 RX ORDER — SENNOSIDES 8.6 MG
650 CAPSULE ORAL EVERY 8 HOURS PRN
Qty: 30 TABLET | Refills: 0 | Status: SHIPPED | OUTPATIENT
Start: 2021-11-18

## 2021-11-18 RX ORDER — ONDANSETRON 2 MG/ML
4 INJECTION INTRAMUSCULAR; INTRAVENOUS EVERY 6 HOURS PRN
Status: DISCONTINUED | OUTPATIENT
Start: 2021-11-18 | End: 2021-11-18 | Stop reason: HOSPADM

## 2021-11-18 RX ORDER — OXYCODONE HYDROCHLORIDE 5 MG/1
10 TABLET ORAL EVERY 4 HOURS PRN
Status: DISCONTINUED | OUTPATIENT
Start: 2021-11-18 | End: 2021-11-18 | Stop reason: HOSPADM

## 2021-11-18 RX ORDER — FENTANYL CITRATE/PF 50 MCG/ML
25 SYRINGE (ML) INJECTION
Status: DISCONTINUED | OUTPATIENT
Start: 2021-11-18 | End: 2021-11-18 | Stop reason: HOSPADM

## 2021-11-18 RX ORDER — FENTANYL CITRATE 50 UG/ML
INJECTION, SOLUTION INTRAMUSCULAR; INTRAVENOUS AS NEEDED
Status: DISCONTINUED | OUTPATIENT
Start: 2021-11-18 | End: 2021-11-18

## 2021-11-18 RX ORDER — MAGNESIUM HYDROXIDE 1200 MG/15ML
LIQUID ORAL AS NEEDED
Status: DISCONTINUED | OUTPATIENT
Start: 2021-11-18 | End: 2021-11-18 | Stop reason: HOSPADM

## 2021-11-18 RX ORDER — HYDROMORPHONE HCL/PF 1 MG/ML
0.5 SYRINGE (ML) INJECTION
Status: DISCONTINUED | OUTPATIENT
Start: 2021-11-18 | End: 2021-11-18 | Stop reason: HOSPADM

## 2021-11-18 RX ORDER — DEXAMETHASONE SODIUM PHOSPHATE 4 MG/ML
INJECTION, SOLUTION INTRA-ARTICULAR; INTRALESIONAL; INTRAMUSCULAR; INTRAVENOUS; SOFT TISSUE AS NEEDED
Status: DISCONTINUED | OUTPATIENT
Start: 2021-11-18 | End: 2021-11-18

## 2021-11-18 RX ORDER — KETOROLAC TROMETHAMINE 30 MG/ML
INJECTION, SOLUTION INTRAMUSCULAR; INTRAVENOUS AS NEEDED
Status: DISCONTINUED | OUTPATIENT
Start: 2021-11-18 | End: 2021-11-18

## 2021-11-18 RX ORDER — ONDANSETRON 2 MG/ML
4 INJECTION INTRAMUSCULAR; INTRAVENOUS ONCE AS NEEDED
Status: DISCONTINUED | OUTPATIENT
Start: 2021-11-18 | End: 2021-11-18 | Stop reason: HOSPADM

## 2021-11-18 RX ORDER — ROCURONIUM BROMIDE 10 MG/ML
INJECTION, SOLUTION INTRAVENOUS AS NEEDED
Status: DISCONTINUED | OUTPATIENT
Start: 2021-11-18 | End: 2021-11-18

## 2021-11-18 RX ORDER — IBUPROFEN 600 MG/1
600 TABLET ORAL EVERY 6 HOURS PRN
Status: DISCONTINUED | OUTPATIENT
Start: 2021-11-18 | End: 2021-11-18 | Stop reason: HOSPADM

## 2021-11-18 RX ORDER — ONDANSETRON 2 MG/ML
INJECTION INTRAMUSCULAR; INTRAVENOUS AS NEEDED
Status: DISCONTINUED | OUTPATIENT
Start: 2021-11-18 | End: 2021-11-18

## 2021-11-18 RX ORDER — OXYCODONE HYDROCHLORIDE 5 MG/1
5 TABLET ORAL EVERY 4 HOURS PRN
Status: DISCONTINUED | OUTPATIENT
Start: 2021-11-18 | End: 2021-11-18 | Stop reason: HOSPADM

## 2021-11-18 RX ORDER — SODIUM CHLORIDE, SODIUM LACTATE, POTASSIUM CHLORIDE, CALCIUM CHLORIDE 600; 310; 30; 20 MG/100ML; MG/100ML; MG/100ML; MG/100ML
INJECTION, SOLUTION INTRAVENOUS CONTINUOUS PRN
Status: DISCONTINUED | OUTPATIENT
Start: 2021-11-18 | End: 2021-11-18

## 2021-11-18 RX ORDER — NEOSTIGMINE METHYLSULFATE 1 MG/ML
INJECTION INTRAVENOUS AS NEEDED
Status: DISCONTINUED | OUTPATIENT
Start: 2021-11-18 | End: 2021-11-18

## 2021-11-18 RX ORDER — ACETAMINOPHEN 325 MG/1
650 TABLET ORAL EVERY 6 HOURS PRN
Status: DISCONTINUED | OUTPATIENT
Start: 2021-11-18 | End: 2021-11-18 | Stop reason: HOSPADM

## 2021-11-18 RX ORDER — GLYCOPYRROLATE 0.2 MG/ML
INJECTION INTRAMUSCULAR; INTRAVENOUS AS NEEDED
Status: DISCONTINUED | OUTPATIENT
Start: 2021-11-18 | End: 2021-11-18

## 2021-11-18 RX ORDER — HYDROMORPHONE HCL/PF 1 MG/ML
SYRINGE (ML) INJECTION AS NEEDED
Status: DISCONTINUED | OUTPATIENT
Start: 2021-11-18 | End: 2021-11-18

## 2021-11-18 RX ORDER — SODIUM CHLORIDE 9 MG/ML
125 INJECTION, SOLUTION INTRAVENOUS CONTINUOUS
Status: DISCONTINUED | OUTPATIENT
Start: 2021-11-18 | End: 2021-11-18

## 2021-11-18 RX ORDER — HYDROCODONE BITARTRATE AND ACETAMINOPHEN 5; 325 MG/1; MG/1
1 TABLET ORAL EVERY 6 HOURS PRN
Qty: 10 TABLET | Refills: 0 | Status: SHIPPED | OUTPATIENT
Start: 2021-11-18 | End: 2021-11-28

## 2021-11-18 RX ORDER — IBUPROFEN 600 MG/1
600 TABLET ORAL EVERY 6 HOURS PRN
Qty: 30 TABLET | Refills: 0 | Status: SHIPPED | OUTPATIENT
Start: 2021-11-18

## 2021-11-18 RX ORDER — PROPOFOL 10 MG/ML
INJECTION, EMULSION INTRAVENOUS AS NEEDED
Status: DISCONTINUED | OUTPATIENT
Start: 2021-11-18 | End: 2021-11-18

## 2021-11-18 RX ORDER — LIDOCAINE HYDROCHLORIDE 20 MG/ML
INJECTION, SOLUTION EPIDURAL; INFILTRATION; INTRACAUDAL; PERINEURAL AS NEEDED
Status: DISCONTINUED | OUTPATIENT
Start: 2021-11-18 | End: 2021-11-18

## 2021-11-18 RX ORDER — MIDAZOLAM HYDROCHLORIDE 2 MG/2ML
INJECTION, SOLUTION INTRAMUSCULAR; INTRAVENOUS AS NEEDED
Status: DISCONTINUED | OUTPATIENT
Start: 2021-11-18 | End: 2021-11-18

## 2021-11-18 RX ADMIN — CEFAZOLIN SODIUM 1000 MG: 2 SOLUTION INTRAVENOUS at 13:46

## 2021-11-18 RX ADMIN — FENTANYL CITRATE 50 MCG: 50 INJECTION INTRAMUSCULAR; INTRAVENOUS at 14:05

## 2021-11-18 RX ADMIN — MIDAZOLAM 2 MG: 1 INJECTION INTRAMUSCULAR; INTRAVENOUS at 13:46

## 2021-11-18 RX ADMIN — FENTANYL CITRATE 50 MCG: 50 INJECTION INTRAMUSCULAR; INTRAVENOUS at 15:51

## 2021-11-18 RX ADMIN — IBUPROFEN 600 MG: 600 TABLET, FILM COATED ORAL at 17:26

## 2021-11-18 RX ADMIN — SODIUM CHLORIDE 125 ML/HR: 0.9 INJECTION, SOLUTION INTRAVENOUS at 16:45

## 2021-11-18 RX ADMIN — FENTANYL CITRATE 25 MCG: 50 INJECTION INTRAMUSCULAR; INTRAVENOUS at 16:26

## 2021-11-18 RX ADMIN — DEXAMETHASONE SODIUM PHOSPHATE 4 MG: 4 INJECTION INTRA-ARTICULAR; INTRALESIONAL; INTRAMUSCULAR; INTRAVENOUS; SOFT TISSUE at 13:54

## 2021-11-18 RX ADMIN — SODIUM CHLORIDE 125 ML/HR: 0.9 INJECTION, SOLUTION INTRAVENOUS at 12:02

## 2021-11-18 RX ADMIN — ROCURONIUM BROMIDE 10 MG: 50 INJECTION, SOLUTION INTRAVENOUS at 14:05

## 2021-11-18 RX ADMIN — ROCURONIUM BROMIDE 40 MG: 50 INJECTION, SOLUTION INTRAVENOUS at 13:53

## 2021-11-18 RX ADMIN — HYDROMORPHONE HYDROCHLORIDE 0.5 MG: 1 INJECTION, SOLUTION INTRAMUSCULAR; INTRAVENOUS; SUBCUTANEOUS at 15:14

## 2021-11-18 RX ADMIN — PROPOFOL 200 MG: 10 INJECTION, EMULSION INTRAVENOUS at 13:53

## 2021-11-18 RX ADMIN — LIDOCAINE HYDROCHLORIDE 80 MG: 20 INJECTION, SOLUTION EPIDURAL; INFILTRATION; INTRACAUDAL; PERINEURAL at 13:53

## 2021-11-18 RX ADMIN — KETOROLAC TROMETHAMINE 30 MG: 30 INJECTION, SOLUTION INTRAMUSCULAR at 15:43

## 2021-11-18 RX ADMIN — FENTANYL CITRATE 50 MCG: 50 INJECTION INTRAMUSCULAR; INTRAVENOUS at 13:53

## 2021-11-18 RX ADMIN — ROCURONIUM BROMIDE 10 MG: 50 INJECTION, SOLUTION INTRAVENOUS at 15:05

## 2021-11-18 RX ADMIN — ONDANSETRON 4 MG: 2 INJECTION INTRAMUSCULAR; INTRAVENOUS at 15:40

## 2021-11-18 RX ADMIN — GLYCOPYRROLATE 0.6 MG: 0.2 INJECTION, SOLUTION INTRAMUSCULAR; INTRAVENOUS at 15:40

## 2021-11-18 RX ADMIN — NEOSTIGMINE METHYLSULFATE 3 MG: 1 INJECTION INTRAVENOUS at 15:40

## 2021-11-18 RX ADMIN — SODIUM CHLORIDE, SODIUM LACTATE, POTASSIUM CHLORIDE, AND CALCIUM CHLORIDE: .6; .31; .03; .02 INJECTION, SOLUTION INTRAVENOUS at 14:47

## 2021-11-18 RX ADMIN — FENTANYL CITRATE 50 MCG: 50 INJECTION INTRAMUSCULAR; INTRAVENOUS at 16:00

## 2022-03-07 ENCOUNTER — CONSULT (OUTPATIENT)
Dept: SURGERY | Facility: CLINIC | Age: 47
End: 2022-03-07
Payer: COMMERCIAL

## 2022-03-07 VITALS
WEIGHT: 130.4 LBS | BODY MASS INDEX: 24 KG/M2 | DIASTOLIC BLOOD PRESSURE: 60 MMHG | HEART RATE: 65 BPM | TEMPERATURE: 98 F | SYSTOLIC BLOOD PRESSURE: 100 MMHG | HEIGHT: 62 IN

## 2022-03-07 DIAGNOSIS — R10.31 RIGHT LOWER QUADRANT ABDOMINAL PAIN: Primary | ICD-10-CM

## 2022-03-07 DIAGNOSIS — K59.00 CONSTIPATION, UNSPECIFIED CONSTIPATION TYPE: ICD-10-CM

## 2022-03-07 DIAGNOSIS — R10.30 GROIN PAIN: Primary | ICD-10-CM

## 2022-03-07 PROBLEM — D25.9 UTERINE LEIOMYOMA: Status: RESOLVED | Noted: 2021-08-06 | Resolved: 2022-03-07

## 2022-03-07 PROCEDURE — 99203 OFFICE O/P NEW LOW 30 MIN: CPT | Performed by: PHYSICIAN ASSISTANT

## 2022-03-07 NOTE — PROGRESS NOTES
Assessment/Plan:   Caitlin Hamilton is a 50 y  o female who is here for:   Chief Complaint   Patient presents with    Groin Pain     x 2 years  Pain in ingruinal area that radiates up into abdomen  Pain is especially prominent when sitting for a while  The pain is described as pinching  She is s/p hysterectomy 10/18/2021, pain resolved for a short time but it is back  Plan:   1  CT abd and pelvis to evaluate for possible hernia not felt on examination  Nothing felt on examination this morning needing surgery  2  She needs to follow up with GI for colonoscopy last year which biopsies showed colitis in the small intestines and large  She also has constipation which needs to be treated     __________________________________________  CC:  Chief Complaint   Patient presents with    Groin Pain     x 2 years  Pain in ingruinal area that radiates up into abdomen  Pain is especially prominent when sitting for a while  The pain is described as pinching  She is s/p hysterectomy 10/18/2021, pain resolved for a short time but it is back  HPI:  Caitlin Hamilton is a 50 y  o female who was referred for evaluation of Groin Pain (x 2 years  Pain in ingruinal area that radiates up into abdomen  Pain is especially prominent when sitting for a while  The pain is described as pinching  She is s/p hysterectomy 10/18/2021, pain resolved for a short time but it is back  )    Currently complaining of persistent right inguinal pain and RLQ pain with occasional radiation up to her RUQ  She     She states the pain is worse with sitting in long car rides when she drives to [place]  She states the pain gets to 8/10 at times and the pain does bother her almost daily  She states when the pain is not very high it sits at a 2/10  She states pain improves with tylenol and Advil  She denies nausea/vomiting  No diarrhea of change in stools  Colonoscopy 2021 shows  Ileitis and colitis and there was no GI follow up  She states she had a resolution of her pain after her CHAGO + BS 10/2021  Path from hysterectomy:     Final Diagnosis   A  Uterus, uterus, BL Fallopian Tubes:  Morcellated Uterus  136 gm  Cervix :              mild chronic cervicitis  Endometrium:    secretory pattern  Myometrium:      multiple intramural and submucosal  leiomyomas, largest 4 9 cm                             superficial  adenomyosis  Bilateral tubes    benign paratubal cyst  -No evidence malignancy           Patient had colonoscopy 2021 showing:  Final Diagnosis   A  Ulceration at appendiceal orifice (biopsy):     - Moderate active colitis  - No granulomas, dysplasia or neoplasia identified  B   Terminal ileal ulceration (biopsy):     - Mild to moderate active ileitis  - No granulomas, dysplasia or neoplasia identified      Comment:  The differential includes infectious, inflammatory and medication effect (clinical correlation recommended)  C   Cecal polyp (cold snare):     - Portions of tubular adenoma; negative for high-grade dysplasia  CT abd/pelvis 9/2021: shows uterine fibroids  MRI 7/2021 shows uterine fibroids and normal ovaries  Duration of pain: over 1 year    Prior abdominal surgery: yes 10/2021 CHAGO +BS      ROS:  Review of Systems   Constitutional: Negative for chills, diaphoresis, fever and unexpected weight change  Respiratory: Negative for chest tightness and shortness of breath  Cardiovascular: Negative for chest pain, palpitations and leg swelling  Gastrointestinal: Positive for abdominal pain and constipation  Negative for anal bleeding, blood in stool, diarrhea, nausea, rectal pain and vomiting  Genitourinary: Negative for difficulty urinating and frequency  Skin: Negative for color change, rash and wound  Neurological: Negative for weakness and numbness  Hematological: Does not bruise/bleed easily  Psychiatric/Behavioral: Negative for confusion  The patient is not nervous/anxious  Patient Active Problem List   Diagnosis   (none) - all problems resolved or deleted       Allergies:  Patient has no known allergies  Current Outpatient Medications:     acetaminophen (TYLENOL) 650 mg CR tablet, Take 1 tablet (650 mg total) by mouth every 8 (eight) hours as needed for mild pain, Disp: 30 tablet, Rfl: 0    ibuprofen (MOTRIN) 600 mg tablet, Take 1 tablet (600 mg total) by mouth every 6 (six) hours as needed for mild pain or moderate pain, Disp: 30 tablet, Rfl: 0    Multiple Vitamin (MULTIVITAMIN ADULT PO), Take 1 tablet by mouth daily  , Disp: , Rfl:     History reviewed  No pertinent past medical history  Past Surgical History:   Procedure Laterality Date    CHEST SURGERY Right     fatty tumor removed    CYSTOSCOPY N/A 11/18/2021    Procedure: CYSTOSCOPY;  Surgeon: Abad Davis DO;  Location: [facility];  Service: Gynecology    ND LAP, SUPRACERVIAL HYSTERECTOMY, <250G N/A 11/18/2021    Procedure: [facility] W/ B/L SALPINGECTOMY;  Surgeon: Abad Davis DO;  Location: [facility];  Service: Gynecology    SKIN BIOPSY      TONSILLECTOMY      WISDOM TOOTH EXTRACTION         Family History   Problem Relation Age of Onset    No Known Problems Mother     Heart disease Father     Hyperthyroidism Son     No Known Problems Maternal Grandmother     Diabetes Maternal Grandfather     Cancer Paternal Grandmother     Heart disease Paternal Grandfather     No Known Problems Son         reports that she has quit smoking  Her smoking use included cigarettes  She has never used smokeless tobacco  She reports current alcohol use  She reports that she does not use drugs  Vitals:    03/07/22 0859   BP: 100/60   Pulse: 65   Temp: 98 °F (36 7 °C)        PHYSICAL EXAM  General Appearance:    Alert, cooperative, no distress      Head:    Normocephalic without obvious abnormality   Eyes:    PERRL, conjunctiva/corneas clear    Neck:   Supple, no adenopathy, no JVD   Back:     Symmetric, no spinal or CVA tenderness   Lungs:      Heart:     Abdomen:     abdomen is soft without significant tenderness, masses, organomegaly or guardingliver is not enlarged, spleen is not enlarged  Some tenderness in RLQ with palpation mild tenderness in the right groin with no hernia appreciated on examination  Extremities:   Extremities normal  No clubbing, cyanosis or edema   Psych:   Normal Affect, AOx3      Neurologic:  Skin:   Strength symmetric, speech intact    Warm, dry, intact, no visible rashes or lesions             Signature:   Channing Morales PA-C  Date: 3/7/2022 Time: 9:30 AM

## 2022-03-18 ENCOUNTER — TELEPHONE (OUTPATIENT)
Dept: SURGERY | Facility: CLINIC | Age: 47
End: 2022-03-18

## 2022-03-18 NOTE — TELEPHONE ENCOUNTER
(documentation)    Received call at 3:00 pm Monday, 3/14/2022 from [facility] supplying address of [facility] and NPI #[id number] so authorization could be initiated for patients CT Abd/Pel  I returned call to [facility] = patient is scheduled for Wednesday 1/16/2022  Informed them that I would probably be unable to obtain authorization in that short time, but I would attempt  Started auth through [facility]  Tracking number [id number]  Case is pended  Clinicals faxed  [facility] called mid Tuesday afternoon - they are aware that case is still pending  Received faxed denial from [facility] (reasons supplied by them - invalid - all records were faxed)  Surgery Scheduler called and spoke to patient  Testing has been rescheduled to Monday, 3/21/2022 and patient will be paying out of pocket

## 2022-04-07 ENCOUNTER — TELEPHONE (OUTPATIENT)
Dept: SURGERY | Facility: CLINIC | Age: 47
End: 2022-04-07

## 2022-04-07 NOTE — TELEPHONE ENCOUNTER
Faxed request to [address] [place] [address] MRI requesting results for patients CT Abd/Pelvis that was scheduled for 3/21/2022      [facility] - Phone: [phone]                   Fax: [phone]

## 2022-04-26 ENCOUNTER — OFFICE VISIT (OUTPATIENT)
Dept: GASTROENTEROLOGY | Facility: MEDICAL CENTER | Age: 47
End: 2022-04-26
Payer: COMMERCIAL

## 2022-04-26 VITALS
BODY MASS INDEX: 23.1 KG/M2 | HEART RATE: 65 BPM | DIASTOLIC BLOOD PRESSURE: 78 MMHG | WEIGHT: 126.3 LBS | TEMPERATURE: 98.3 F | SYSTOLIC BLOOD PRESSURE: 115 MMHG

## 2022-04-26 DIAGNOSIS — R10.31 RLQ ABDOMINAL PAIN: Primary | ICD-10-CM

## 2022-04-26 PROCEDURE — 99214 OFFICE O/P EST MOD 30 MIN: CPT | Performed by: INTERNAL MEDICINE

## 2022-04-26 NOTE — PROGRESS NOTES
[facility] - Outpatient Follow-up Note  Ami Weber 50 y o  female MRN: [id number]  Encounter: [id number]          ASSESSMENT AND PLAN:      1  RLQ abdominal pain  Labs to rule out IBD, if CRP and calprotectin are elevated we will do CT enterography to evaluate for Crohn's disease   - C-reactive protein; Future  - Calprotectin,Fecal; Future  - CBC and differential; Future  - Basic metabolic panel; Future  - Hepatic function panel; Future    ______________________________________________________________________    SUBJECTIVE:    53-year-old female presented after colonoscopy  She underwent colonoscopy because of chronic right lower quadrant abdominal pain  She underwent evaluation by gyn status post hysterectomy with no relief of her pain  She reported pain is in the right lower quadrant radiating to the back  Pain is constant  It was tender to palpate  It is not related with food intake  Patient also reported chronic constipation for many years  She takes laxative on line with good relief of her symptoms  She underwent a colonoscopy with findings of 1 small ulcer in the cecum and 1 small ulcer in TI  Biopsy showed active colitis an active ileitis  A small tubular adenoma was removed  Her most recent labs showed normal blood count normal liver and kidney function tests  Her recent CT showed no acute abnormalities in the abdomen  REVIEW OF SYSTEMS IS OTHERWISE NEGATIVE  Historical Information   History reviewed  No pertinent past medical history    Past Surgical History:   Procedure Laterality Date    CHEST SURGERY Right     fatty tumor removed    CYSTOSCOPY N/A 11/18/2021    Procedure: CYSTOSCOPY;  Surgeon: Neo Ni DO;  [facility];  Service: Gynecology    JORGE LUIS GOMES, Kalin Carbajal 442, <250G N/A 11/18/2021    Procedure: [address] [place] [address] W/ B/L SALPINGECTOMY;  Surgeon: Neo Ni DO;  [facility];  Service: Gynecology   [facility] SKIN BIOPSY      TONSILLECTOMY      WISDOM TOOTH EXTRACTION       Social History   Social History     Substance and Sexual Activity   Alcohol Use Yes    Comment: Wine- one glass per night     Social History     Substance and Sexual Activity   Drug Use Never     Social History     Tobacco Use   Smoking Status Former Smoker    Types: Cigarettes   Smokeless Tobacco Never Used   Tobacco Comment    Smoked from ages [age]     Family History   Problem Relation Age of Onset    No Known Problems Mother     Heart disease Father     Hyperthyroidism Son     No Known Problems Maternal Grandmother     Diabetes Maternal Grandfather     Cancer Paternal Grandmother     Heart disease Paternal Grandfather     No Known Problems Son        Meds/Allergies       Current Outpatient Medications:     acetaminophen (TYLENOL) 650 mg CR tablet    ibuprofen (MOTRIN) 600 mg tablet    Multiple Vitamin (MULTIVITAMIN ADULT PO)    No Known Allergies        Objective     Blood pressure 115/78, pulse 65, temperature 98 3 °F (36 8 °C), temperature source Probe, weight 57 3 kg (126 lb 4 8 oz)  Body mass index is 23 1 kg/m²  PHYSICAL EXAM:      General Appearance:   Alert, cooperative, no distress   HEENT:   Normocephalic, atraumatic, anicteric      Neck:  Supple, symmetrical, trachea midline   Lungs:   Clear to auscultation bilaterally; no rales, rhonchi or wheezing; respirations unlabored    Heart[de-identified]   Regular rate and rhythm; no murmur, rub, or gallop  Abdomen:   Soft, non-tender, non-distended; normal bowel sounds; no masses, no organomegaly    Genitalia:   Deferred    Rectal:   Deferred    Extremities:  No cyanosis, clubbing or edema    Pulses:  2+ and symmetric    Skin:  No jaundice, rashes, or lesions    Lymph nodes:  No palpable cervical lymphadenopathy        Lab Results:   No visits with results within 1 Day(s) from this visit     Latest known visit with results is:   Admission on 11/18/2021, Discharged on 11/18/2021 Component Date Value    EXT Preg Test, Ur 11/18/2021 Negative     Control 11/18/2021 Valid     Hemoglobin A1C 11/18/2021 5 2     EAG 11/18/2021 103     ABO Grouping 11/18/2021 O     Rh Factor 11/18/2021 Positive     Antibody Screen 11/18/2021 Negative     Specimen Expiration Date 11/18/2021 [id number]     Case Report 11/18/2021                      Value:Surgical Pathology Report                         Case: [id number]                                   Authorizing Provider:  Haily Kohler DO       Collected:           11/18/2021 1424              Ordering Location:     [facility]        Received:            11/18/2021 Janay Sawant [address] Operating Room                                                     Pathologist:           Daniel Guerrero MD                                                              Specimen:    Uterus, uterus, BL Fallopian Tubes                                                         Final Diagnosis 11/18/2021                      Value: This result contains rich text formatting which cannot be displayed here   Note 11/18/2021                      Value: This result contains rich text formatting which cannot be displayed here   Additional Information 11/18/2021                      Value: This result contains rich text formatting which cannot be displayed here  Xander Whelan Gross Description 11/18/2021                      Value: This result contains rich text formatting which cannot be displayed here  Radiology Results:   No results found    Answers for HPI/ROS submitted by the patient on 4/25/2022  Chronicity: chronic  Onset: more than 1 year ago  Onset quality: gradual  Frequency: constantly  Episode duration: 3 months  Progression since onset: waxing and waning  Pain location: right flank  Pain - numeric: 5/10  Pain quality: aching, burning, sharp  Radiates to: right flank  frequency: Yes  Diagnostic workup: CT scan, lower endoscopy, surgery, ultrasound

## 2022-04-29 ENCOUNTER — APPOINTMENT (OUTPATIENT)
Dept: LAB | Facility: MEDICAL CENTER | Age: 47
End: 2022-04-29
Attending: INTERNAL MEDICINE
Payer: COMMERCIAL

## 2022-04-29 DIAGNOSIS — R10.31 RLQ ABDOMINAL PAIN: ICD-10-CM

## 2022-04-29 LAB
ALBUMIN SERPL BCP-MCNC: 3.8 G/DL (ref 3.5–5)
ALP SERPL-CCNC: 45 U/L (ref 46–116)
ALT SERPL W P-5'-P-CCNC: 27 U/L (ref 12–78)
ANION GAP SERPL CALCULATED.3IONS-SCNC: 3 MMOL/L (ref 4–13)
AST SERPL W P-5'-P-CCNC: 19 U/L (ref 5–45)
BASOPHILS # BLD AUTO: 0.08 THOUSANDS/ΜL (ref 0–0.1)
BASOPHILS NFR BLD AUTO: 1 % (ref 0–1)
BILIRUB DIRECT SERPL-MCNC: 0.16 MG/DL (ref 0–0.2)
BILIRUB SERPL-MCNC: 0.68 MG/DL (ref 0.2–1)
BUN SERPL-MCNC: 15 MG/DL (ref 5–25)
CALCIUM SERPL-MCNC: 8.9 MG/DL (ref 8.3–10.1)
CHLORIDE SERPL-SCNC: 106 MMOL/L (ref 100–108)
CO2 SERPL-SCNC: 28 MMOL/L (ref 21–32)
CREAT SERPL-MCNC: 0.9 MG/DL (ref 0.6–1.3)
CRP SERPL QL: <3 MG/L
EOSINOPHIL # BLD AUTO: 0.27 THOUSAND/ΜL (ref 0–0.61)
EOSINOPHIL NFR BLD AUTO: 4 % (ref 0–6)
ERYTHROCYTE [DISTWIDTH] IN BLOOD BY AUTOMATED COUNT: 12.7 % (ref 11.6–15.1)
GFR SERPL CREATININE-BSD FRML MDRD: 75 ML/MIN/1.73SQ M
GLUCOSE P FAST SERPL-MCNC: 91 MG/DL (ref 65–99)
HCT VFR BLD AUTO: 40.3 % (ref 34.8–46.1)
HGB BLD-MCNC: 13.6 G/DL (ref 11.5–15.4)
IMM GRANULOCYTES # BLD AUTO: 0.02 THOUSAND/UL (ref 0–0.2)
IMM GRANULOCYTES NFR BLD AUTO: 0 % (ref 0–2)
LYMPHOCYTES # BLD AUTO: 1.47 THOUSANDS/ΜL (ref 0.6–4.47)
LYMPHOCYTES NFR BLD AUTO: 20 % (ref 14–44)
MCH RBC QN AUTO: 30.8 PG (ref 26.8–34.3)
MCHC RBC AUTO-ENTMCNC: 33.7 G/DL (ref 31.4–37.4)
MCV RBC AUTO: 91 FL (ref 82–98)
MONOCYTES # BLD AUTO: 0.52 THOUSAND/ΜL (ref 0.17–1.22)
MONOCYTES NFR BLD AUTO: 7 % (ref 4–12)
NEUTROPHILS # BLD AUTO: 4.99 THOUSANDS/ΜL (ref 1.85–7.62)
NEUTS SEG NFR BLD AUTO: 68 % (ref 43–75)
NRBC BLD AUTO-RTO: 0 /100 WBCS
PLATELET # BLD AUTO: 232 THOUSANDS/UL (ref 149–390)
PMV BLD AUTO: 10 FL (ref 8.9–12.7)
POTASSIUM SERPL-SCNC: 4.5 MMOL/L (ref 3.5–5.3)
PROT SERPL-MCNC: 7.1 G/DL (ref 6.4–8.2)
RBC # BLD AUTO: 4.42 MILLION/UL (ref 3.81–5.12)
SODIUM SERPL-SCNC: 137 MMOL/L (ref 136–145)
WBC # BLD AUTO: 7.35 THOUSAND/UL (ref 4.31–10.16)

## 2022-04-29 PROCEDURE — 86140 C-REACTIVE PROTEIN: CPT

## 2022-04-29 PROCEDURE — 80076 HEPATIC FUNCTION PANEL: CPT

## 2022-04-29 PROCEDURE — 85025 COMPLETE CBC W/AUTO DIFF WBC: CPT

## 2022-04-29 PROCEDURE — 36415 COLL VENOUS BLD VENIPUNCTURE: CPT

## 2022-04-29 PROCEDURE — 80048 BASIC METABOLIC PNL TOTAL CA: CPT

## 2022-04-29 PROCEDURE — 83993 ASSAY FOR CALPROTECTIN FECAL: CPT

## 2022-05-04 LAB — CALPROTECTIN STL-MCNT: 29 UG/G (ref 0–120)

## 2022-05-05 ENCOUNTER — TELEPHONE (OUTPATIENT)
Dept: GASTROENTEROLOGY | Facility: AMBULARY SURGERY CENTER | Age: 47
End: 2022-05-05

## 2022-05-05 NOTE — TELEPHONE ENCOUNTER
Patients GI provider:  Dr Gemma Stewart    Number to return call: (  [phone]    Reason for call: Pt returning call for results    Scheduled procedure/appointment date if applicable: Appt 1-62-06

## 2022-09-08 ENCOUNTER — TELEPHONE (OUTPATIENT)
Dept: OBGYN CLINIC | Facility: HOSPITAL | Age: 47
End: 2022-09-08

## 2022-09-08 ENCOUNTER — OFFICE VISIT (OUTPATIENT)
Dept: GASTROENTEROLOGY | Facility: MEDICAL CENTER | Age: 47
End: 2022-09-08
Payer: COMMERCIAL

## 2022-09-08 VITALS
HEART RATE: 68 BPM | SYSTOLIC BLOOD PRESSURE: 104 MMHG | DIASTOLIC BLOOD PRESSURE: 68 MMHG | TEMPERATURE: 98.7 F | BODY MASS INDEX: 22.28 KG/M2 | WEIGHT: 121.8 LBS

## 2022-09-08 DIAGNOSIS — K52.9 COLITIS WITHOUT COMPLICATION: ICD-10-CM

## 2022-09-08 DIAGNOSIS — D12.6 ADENOMATOUS POLYP OF COLON, UNSPECIFIED PART OF COLON: Primary | ICD-10-CM

## 2022-09-08 PROCEDURE — 99214 OFFICE O/P EST MOD 30 MIN: CPT | Performed by: INTERNAL MEDICINE

## 2022-09-08 NOTE — PATIENT INSTRUCTIONS
Scheduled date of 10/19/22 (as of today) 9/8/22  Physician performing Dr Raghav Loyola:  Location of procedure  [place]:  Bowel prep reviewed with patient: golytely/dulcolax  Instructions reviewed with patient by: MA  Clearances:

## 2022-09-08 NOTE — PROGRESS NOTES
[facility] - Outpatient Follow-up Note  Ion Smart 50 y o  female MRN: [id number]  Encounter: [id number]          ASSESSMENT AND PLAN:      1  Adenomatous polyp of colon, unspecified part of colon  Plan to repeat colonoscopy to biopsy appendical area for colitis evaluation, surveillance for TA  - polyethylene glycol (GOLYTELY) 4000 mL solution; Take 4,000 mL by mouth once for 1 dose  Dispense: 4000 mL; Refill: 0  - bisacodyl (DULCOLAX) 5 mg EC tablet; Take 2 tablets (10 mg total) by mouth once for 1 dose  Dispense: 2 tablet; Refill: 0  - Colonoscopy; Future  2  RLQ pain  Likely to be functional as extensive workups has been negative  3  Constipation  Patient has many years of constipation responded to laxatives she takes everyday  ______________________________________________________________________    SUBJECTIVE:    68-year-old female presented to follow-up right lower quadrant abdominal pain  Patient reported she has been having pain for many years and she still has intermittent pain  She reported pain moves started from her pubic area moves to the right lower quadrant and sometimes radiates to the back  She had a colonoscopy last year with findings of active colitis around the appendiceal area  Biopsy showed active colitis  Her colonoscopy showed fair prep and one TA was removed  She had CT scan with and without iv contrast in the end of March this year showing no signs of appendicitis  Her CRP and Stool calprotectin was negative  REVIEW OF SYSTEMS IS OTHERWISE NEGATIVE  Historical Information   History reviewed  No pertinent past medical history    Past Surgical History:   Procedure Laterality Date    CHEST SURGERY Right     fatty tumor removed    CYSTOSCOPY N/A 11/18/2021    Procedure: CYSTOSCOPY;  Surgeon: Chad Ireland DO;  [facility];  Service: Gynecology    Kalin CURTIS 442, <250G N/A 11/18/2021    Procedure: [facility] W/ B/L SALPINGECTOMY;  Surgeon: Francisco Martínez DO;  [facility];  Service: Gynecology    SKIN BIOPSY      TONSILLECTOMY      WISDOM TOOTH EXTRACTION       Social History   Social History     Substance and Sexual Activity   Alcohol Use Yes    Comment: Wine- one glass per night     Social History     Substance and Sexual Activity   Drug Use Never     Social History     Tobacco Use   Smoking Status Former Smoker    Types: Cigarettes   Smokeless Tobacco Never Used   Tobacco Comment    Smoked from ages [age]     Family History   Problem Relation Age of Onset    No Known Problems Mother     Heart disease Father     Hyperthyroidism Son     No Known Problems Maternal Grandmother     Diabetes Maternal Grandfather     Cancer Paternal Grandmother     Heart disease Paternal Grandfather     No Known Problems Son        Meds/Allergies       Current Outpatient Medications:     acetaminophen (TYLENOL) 650 mg CR tablet    bisacodyl (DULCOLAX) 5 mg EC tablet    ibuprofen (MOTRIN) 600 mg tablet    polyethylene glycol (GOLYTELY) 4000 mL solution    Multiple Vitamin (MULTIVITAMIN ADULT PO)    No Known Allergies        Objective     Blood pressure 104/68, pulse 68, temperature 98 7 °F (37 1 °C), temperature source Tympanic, weight 55 2 kg (121 lb 12 8 oz)  Body mass index is 22 28 kg/m²  PHYSICAL EXAM:      General Appearance:   Alert, cooperative, no distress   HEENT:   Normocephalic, atraumatic, anicteric      Neck:  Supple, symmetrical, trachea midline   Lungs:   Clear to auscultation bilaterally; no rales, rhonchi or wheezing; respirations unlabored    Heart[de-identified]   Regular rate and rhythm; no murmur, rub, or gallop     Abdomen:   Soft, non-tender, non-distended; normal bowel sounds; no masses, no organomegaly    Genitalia:   Deferred    Rectal:   Deferred    Extremities:  No cyanosis, clubbing or edema    Pulses:  2+ and symmetric    Skin:  No jaundice, rashes, or lesions    Lymph nodes:  No palpable cervical lymphadenopathy        Lab Results:   No visits with results within 1 Day(s) from this visit  Latest known visit with results is:   Appointment on 04/29/2022   Component Date Value    CRP 04/29/2022 <3 0     Calprotectin 04/29/2022 29     WBC 04/29/2022 7 35     RBC 04/29/2022 4 42     Hemoglobin 04/29/2022 13 6     Hematocrit 04/29/2022 40 3     MCV 04/29/2022 91     MCH 04/29/2022 30 8     MCHC 04/29/2022 33 7     RDW 04/29/2022 12 7     MPV 04/29/2022 10 0     Platelets 53/38/9104 232     nRBC 04/29/2022 0     Neutrophils Relative 04/29/2022 68     Immat GRANS % 04/29/2022 0     Lymphocytes Relative 04/29/2022 20     Monocytes Relative 04/29/2022 7     Eosinophils Relative 04/29/2022 4     Basophils Relative 04/29/2022 1     Neutrophils Absolute 04/29/2022 4 99     Immature Grans Absolute 04/29/2022 0 02     Lymphocytes Absolute 04/29/2022 1 47     Monocytes Absolute 04/29/2022 0 52     Eosinophils Absolute 04/29/2022 0 27     Basophils Absolute 04/29/2022 0 08     Sodium 04/29/2022 137     Potassium 04/29/2022 4 5     Chloride 04/29/2022 106     CO2 04/29/2022 28     ANION GAP 04/29/2022 3 (A)    BUN 04/29/2022 15     Creatinine 04/29/2022 0 90     Glucose, Fasting 04/29/2022 91     Calcium 04/29/2022 8 9     eGFR 04/29/2022 75     Total Bilirubin 04/29/2022 0 68     Bilirubin, Direct 04/29/2022 0 16     Alkaline Phosphatase 04/29/2022 45 (A)    AST 04/29/2022 19     ALT 04/29/2022 27     Total Protein 04/29/2022 7 1     Albumin 04/29/2022 3 8          Radiology Results:   No results found    Answers for HPI/ROS submitted by the patient on 9/7/2022  Chronicity: chronic  Onset: more than 1 year ago  Onset quality: sudden  Frequency: every several days  Episode duration: 3 days  Progression since onset: waxing and waning  Pain location: RLQ  Pain - numeric: 8/10  Pain quality: aching, dull, sharp  Radiates to: back, right flank  arthralgias: Yes  headaches: Yes  myalgias: Yes  Aggravated by: certain positions, movement  Relieved by: nothing  Diagnostic workup: CT scan, lower endoscopy, surgery, ultrasound

## 2022-10-17 RX ORDER — SODIUM CHLORIDE 9 MG/ML
125 INJECTION, SOLUTION INTRAVENOUS CONTINUOUS
Status: CANCELLED | OUTPATIENT
Start: 2022-10-17

## 2022-10-19 ENCOUNTER — ANESTHESIA EVENT (OUTPATIENT)
Dept: GASTROENTEROLOGY | Facility: MEDICAL CENTER | Age: 47
End: 2022-10-19

## 2022-10-19 ENCOUNTER — ANESTHESIA (OUTPATIENT)
Dept: GASTROENTEROLOGY | Facility: MEDICAL CENTER | Age: 47
End: 2022-10-19

## 2022-10-19 ENCOUNTER — HOSPITAL ENCOUNTER (OUTPATIENT)
Dept: GASTROENTEROLOGY | Facility: MEDICAL CENTER | Age: 47
Setting detail: OUTPATIENT SURGERY
Discharge: HOME | End: 2022-10-19
Attending: INTERNAL MEDICINE
Payer: COMMERCIAL

## 2022-10-19 VITALS
RESPIRATION RATE: 20 BRPM | HEART RATE: 74 BPM | OXYGEN SATURATION: 100 % | TEMPERATURE: 98.1 F | BODY MASS INDEX: 22.26 KG/M2 | SYSTOLIC BLOOD PRESSURE: 95 MMHG | DIASTOLIC BLOOD PRESSURE: 55 MMHG | WEIGHT: 121 LBS | HEIGHT: 62 IN

## 2022-10-19 DIAGNOSIS — D12.6 ADENOMATOUS POLYP OF COLON, UNSPECIFIED PART OF COLON: ICD-10-CM

## 2022-10-19 PROCEDURE — 45378 DIAGNOSTIC COLONOSCOPY: CPT | Performed by: INTERNAL MEDICINE

## 2022-10-19 RX ORDER — SODIUM CHLORIDE 9 MG/ML
125 INJECTION, SOLUTION INTRAVENOUS CONTINUOUS
Status: DISCONTINUED | OUTPATIENT
Start: 2022-10-19 | End: 2022-10-23 | Stop reason: HOSPADM

## 2022-10-19 RX ORDER — PROPOFOL 10 MG/ML
INJECTION, EMULSION INTRAVENOUS AS NEEDED
Status: DISCONTINUED | OUTPATIENT
Start: 2022-10-19 | End: 2022-10-19

## 2022-10-19 RX ADMIN — PROPOFOL 100 MG: 10 INJECTION, EMULSION INTRAVENOUS at 13:22

## 2022-10-19 RX ADMIN — Medication 40 MG: at 13:26

## 2022-10-19 RX ADMIN — PROPOFOL 100 MG: 10 INJECTION, EMULSION INTRAVENOUS at 13:27

## 2022-10-19 RX ADMIN — SODIUM CHLORIDE 125 ML/HR: 0.9 INJECTION, SOLUTION INTRAVENOUS at 13:14

## 2022-10-19 RX ADMIN — PROPOFOL 100 MG: 10 INJECTION, EMULSION INTRAVENOUS at 13:30

## 2022-10-19 NOTE — ANESTHESIA PREPROCEDURE EVALUATION
Procedure:  COLONOSCOPY    Relevant Problems   No relevant active problems        Physical Exam    Airway    Mallampati score: II  TM Distance: >3 FB  Neck ROM: full     Dental       Cardiovascular  Rhythm: regular, Rate: normal,     Pulmonary  Breath sounds clear to auscultation,     Other Findings        Anesthesia Plan  ASA Score- 1     Anesthesia Type- general with ASA Monitors  Additional Monitors:   Airway Plan:           Plan Factors-Exercise tolerance (METS): >4 METS  Chart reviewed  Existing labs reviewed  Patient summary reviewed  Patient is not a current smoker  Patient not instructed to abstain from smoking on day of procedure  Patient did not smoke on day of surgery  There is medical exclusion for perioperative obstructive sleep apnea risk education  Induction- intravenous  Postoperative Plan-     Informed Consent- Anesthetic plan and risks discussed with patient

## 2022-10-19 NOTE — H&P
History and Physical - [facility]  Nate Montez 50 y o  female MRN: [id number]                  HPI: Nate Montez is a 50y o  year old female who presents for hx of colon polyps      REVIEW OF SYSTEMS: Per the HPI, and otherwise unremarkable  Historical Information   History reviewed  No pertinent past medical history    Past Surgical History:   Procedure Laterality Date   • CHEST SURGERY Right     fatty tumor removed   • CYSTOSCOPY N/A 11/18/2021    Procedure: CYSTOSCOPY;  Surgeon: Mahamed Witt DO;  [facility];  Service: Gynecology   • [place] LAP, SUPRACERVIAL HYSTERECTOMY, <250G N/A 11/18/2021    Procedure: [address] [place] [address] W/ B/L SALPINGECTOMY;  Surgeon: Mahamed Witt DO;  [facility];  Service: Gynecology   • SKIN BIOPSY     • TONSILLECTOMY     • WISDOM TOOTH EXTRACTION       Social History   Social History     Substance and Sexual Activity   Alcohol Use Yes    Comment: Wine- one glass per night     Social History     Substance and Sexual Activity   Drug Use Never     Social History     Tobacco Use   Smoking Status Former Smoker   • Types: Cigarettes   Smokeless Tobacco Never Used   Tobacco Comment    Smoked from ages [age]     Family History   Problem Relation Age of Onset   • No Known Problems Mother    • Heart disease Father    • Hyperthyroidism Son    • No Known Problems Maternal Grandmother    • Diabetes Maternal Grandfather    • Cancer Paternal Grandmother    • Heart disease Paternal Grandfather    • No Known Problems Son        Meds/Allergies       Current Outpatient Medications:   •  acetaminophen (TYLENOL) 650 mg CR tablet  •  bisacodyl (DULCOLAX) 5 mg EC tablet  •  ibuprofen (MOTRIN) 600 mg tablet  •  Multiple Vitamin (MULTIVITAMIN ADULT PO)  •  polyethylene glycol (GOLYTELY) 4000 mL solution    Current Facility-Administered Medications:   •  sodium chloride 0 9 % infusion, 125 mL/hr, Intravenous, Continuous    No Known Allergies    Objective     There were no vitals taken for this visit  PHYSICAL EXAM    Gen: NAD  Head: NCAT  CV: RRR  CHEST: Clear  ABD: soft, NT/ND  EXT: no edema      ASSESSMENT/PLAN:  This is a 50y o  year old female here for hx of colon polyps, and she is stable and optimized for her procedure

## 2022-10-19 NOTE — DISCHARGE INSTRUCTIONS
Colonoscopy   WHAT YOU NEED TO KNOW:   A colonoscopy is a procedure to examine the inside of your colon (intestine) with a scope  Polyps or tissue growths may have been removed during your colonoscopy  It is normal to feel bloated and to have some abdominal discomfort  You should be passing gas  If you have hemorrhoids or you had polyps removed, you may have a small amount of bleeding  DISCHARGE INSTRUCTIONS:   Seek care immediately if:   You have sudden, severe abdominal pain  You have problems swallowing  You have a large amount of black, sticky bowel movements or blood in your bowel movements  You have sudden trouble breathing  You feel weak, lightheaded, or faint or your heart beats faster than normal for you  Contact your healthcare provider if:   You have a fever and chills  You have nausea or are vomiting  Your abdomen is bloated or feels full and hard  You have abdominal pain  You have black, sticky bowel movements or blood in your bowel movements  You have not had a bowel movement for 3 days after your procedure  You have rash or hives  You have questions or concerns about your procedure  Activity:   Do not lift, strain, or run for 24 hours after your procedure  Rest after your procedure  You have been given medicine to relax you  Do not drive or make important decisions until the day after your procedure  Return to your normal activity as directed  Relieve gas and discomfort from bloating by lying on your right side with a heating pad on your abdomen  You may need to take short walks to help the gas move out  Eat small meals until bloating is relieved  Follow up with your healthcare provider as directed: Write down your questions so you remember to ask them during your visits  If you take a “blood thinner”, please review the specific instructions from your endoscopist about when you should resume it   These can be found in the “Recommendation” and “Your Medication list” sections of this After Visit Summary    ;

## 2022-10-19 NOTE — ANESTHESIA POSTPROCEDURE EVALUATION
Post-Op Assessment Note    CV Status:  Stable  Pain Score: 1    Pain management: adequate     Mental Status:  Alert and awake   Hydration Status:  Euvolemic   PONV Controlled:  Controlled   Airway Patency:  Patent      Post Op Vitals Reviewed: Yes      Staff: Anesthesiologist         No complications documented      BP (!) 87/55 (10/19/22 1340)    Temp      Pulse 78 (10/19/22 1340)   Resp 20 (10/19/22 1340)    SpO2 97 % (10/19/22 1340)
[place]

## 2025-03-13 ENCOUNTER — HOSPITAL ENCOUNTER (OUTPATIENT)
Dept: CT IMAGING | Facility: HOSPITAL | Age: 50
Discharge: HOME | End: 2025-03-13
Attending: INTERNAL MEDICINE
Payer: COMMERCIAL

## 2025-03-13 DIAGNOSIS — E78.5 HYPERLIPIDEMIA, UNSPECIFIED HYPERLIPIDEMIA TYPE: ICD-10-CM

## 2025-03-13 PROCEDURE — 75571 CT HRT W/O DYE W/CA TEST: CPT

## (undated) DEVICE — 3000CC GUARDIAN II: Brand: GUARDIAN

## (undated) DEVICE — PACK TUR

## (undated) DEVICE — PLUMEPEN PRO 10FT

## (undated) DEVICE — SCD SEQUENTIAL COMPRESSION COMFORT SLEEVE MEDIUM KNEE LENGTH: Brand: KENDALL SCD

## (undated) DEVICE — MAYO STAND COVER: Brand: CONVERTORS

## (undated) DEVICE — IRRIG ENDO FLO TUBING

## (undated) DEVICE — SUT VICRYL 0 UR-6 27 IN J603H

## (undated) DEVICE — BETHLEHEM UNIVERSAL GYN LAP PK: Brand: CARDINAL HEALTH

## (undated) DEVICE — KIT INCLUDES:GTB14, ALEXIS CONTAINED EXT SYS 5BXCNGL3, GELPOINT MINI ADV ACCESS PLATFORM: Brand: ALEXIS CONTAINED EXTRACTION SYSTEM WITH GELPOINT MINI ADVANCED ACCESS PLATFORM

## (undated) DEVICE — ADHESIVE SKIN HIGH VISCOSITY EXOFIN 1ML

## (undated) DEVICE — Device: Brand: OLYMPUS

## (undated) DEVICE — IV EXTENSION TUBING 33 IN

## (undated) DEVICE — PLASTIC ADHESIVE BANDAGE: Brand: CURITY

## (undated) DEVICE — CHLORAPREP HI-LITE 26ML ORANGE

## (undated) DEVICE — [HIGH FLOW INSUFFLATOR,  DO NOT USE IF PACKAGE IS DAMAGED,  KEEP DRY,  KEEP AWAY FROM SUNLIGHT,  PROTECT FROM HEAT AND RADIOACTIVE SOURCES.]: Brand: PNEUMOSURE

## (undated) DEVICE — SUT VICRYL 4-0 PS-2 27 IN J426H

## (undated) DEVICE — TROCAR: Brand: KII® SLEEVE

## (undated) DEVICE — 3M™ STERI-STRIP™ REINFORCED ADHESIVE SKIN CLOSURES, R1547, 1/2 IN X 4 IN (12 MM X 100 MM), 6 STRIPS/ENVELOPE: Brand: 3M™ STERI-STRIP™

## (undated) DEVICE — GLOVE INDICATOR PI UNDERGLOVE SZ 7 BLUE

## (undated) DEVICE — DRAPE EQUIPMENT RF WAND

## (undated) DEVICE — UNDYED BRAIDED (POLYGLACTIN 910), SYNTHETIC ABSORBABLE SUTURE: Brand: COATED VICRYL

## (undated) DEVICE — LAPAROSCOPIC SMOKE EVAC TUBING

## (undated) DEVICE — TRAY FOLEY 16FR URIMETER SILICONE SURESTEP

## (undated) DEVICE — SYRINGE 50ML LL

## (undated) DEVICE — MORCELLATOR LAP LINA XCISE 15 MM OBTURATOR CRDLS

## (undated) DEVICE — TROCAR: Brand: KII FIOS FIRST ENTRY

## (undated) DEVICE — GLOVE PI ULTRA TOUCH SZ.6.5

## (undated) DEVICE — INTENDED FOR TISSUE SEPARATION, AND OTHER PROCEDURES THAT REQUIRE A SHARP SURGICAL BLADE TO PUNCTURE OR CUT.: Brand: BARD-PARKER SAFETY BLADES SIZE 11, STERILE

## (undated) DEVICE — TISSUE RETRIEVAL SYSTEM: Brand: INZII RETRIEVAL SYSTEM

## (undated) DEVICE — PREMIUM DRY TRAY LF: Brand: MEDLINE INDUSTRIES, INC.

## (undated) DEVICE — CLOSURE DEVICE ENDO CLOSE

## (undated) DEVICE — GLOVE PI ULTRA TOUCH SZ.7.0

## (undated) DEVICE — DRAPE SURGIKIT SADDLE BAG LAP

## (undated) DEVICE — TROCARS: Brand: KII® OPTICAL ACCESS SYSTEM